# Patient Record
Sex: FEMALE | Race: WHITE | URBAN - METROPOLITAN AREA
[De-identification: names, ages, dates, MRNs, and addresses within clinical notes are randomized per-mention and may not be internally consistent; named-entity substitution may affect disease eponyms.]

---

## 2024-03-07 ENCOUNTER — NURSING HOME VISIT (OUTPATIENT)
Dept: GERIATRICS | Facility: OTHER | Age: 84
End: 2024-03-07
Payer: COMMERCIAL

## 2024-03-07 DIAGNOSIS — T50.2X5A DIURETIC-INDUCED HYPOKALEMIA: ICD-10-CM

## 2024-03-07 DIAGNOSIS — E03.9 HYPOTHYROIDISM, UNSPECIFIED TYPE: ICD-10-CM

## 2024-03-07 DIAGNOSIS — E78.5 HYPERLIPIDEMIA, UNSPECIFIED HYPERLIPIDEMIA TYPE: ICD-10-CM

## 2024-03-07 DIAGNOSIS — I10 ESSENTIAL (PRIMARY) HYPERTENSION: ICD-10-CM

## 2024-03-07 DIAGNOSIS — K21.9 GASTROESOPHAGEAL REFLUX DISEASE WITHOUT ESOPHAGITIS: ICD-10-CM

## 2024-03-07 DIAGNOSIS — Z66 DNR (DO NOT RESUSCITATE): ICD-10-CM

## 2024-03-07 DIAGNOSIS — E87.6 DIURETIC-INDUCED HYPOKALEMIA: ICD-10-CM

## 2024-03-07 DIAGNOSIS — E55.9 VITAMIN D DEFICIENCY: ICD-10-CM

## 2024-03-07 DIAGNOSIS — Z87.898 HISTORY OF SEIZURES: ICD-10-CM

## 2024-03-07 DIAGNOSIS — I50.32 CHRONIC HEART FAILURE WITH PRESERVED EJECTION FRACTION (HCC): ICD-10-CM

## 2024-03-07 DIAGNOSIS — D62 ACUTE BLOOD LOSS AS CAUSE OF POSTOPERATIVE ANEMIA: ICD-10-CM

## 2024-03-07 DIAGNOSIS — I25.10 CORONARY ARTERY DISEASE INVOLVING NATIVE CORONARY ARTERY OF NATIVE HEART WITHOUT ANGINA PECTORIS: ICD-10-CM

## 2024-03-07 DIAGNOSIS — G47.00 INSOMNIA, UNSPECIFIED TYPE: ICD-10-CM

## 2024-03-07 DIAGNOSIS — S72.145D CLOSED NONDISPLACED INTERTROCHANTERIC FRACTURE OF LEFT FEMUR WITH ROUTINE HEALING, SUBSEQUENT ENCOUNTER: Primary | ICD-10-CM

## 2024-03-07 PROCEDURE — 99306 1ST NF CARE HIGH MDM 50: CPT | Performed by: INTERNAL MEDICINE

## 2024-03-11 ENCOUNTER — TELEPHONE (OUTPATIENT)
Dept: OTHER | Facility: OTHER | Age: 84
End: 2024-03-11

## 2024-03-11 PROBLEM — J44.9 COPD (CHRONIC OBSTRUCTIVE PULMONARY DISEASE) (HCC): Status: ACTIVE | Noted: 2024-03-11

## 2024-03-11 PROBLEM — E87.6 DIURETIC-INDUCED HYPOKALEMIA: Status: ACTIVE | Noted: 2024-03-11

## 2024-03-11 PROBLEM — K21.9 GASTROESOPHAGEAL REFLUX DISEASE WITHOUT ESOPHAGITIS: Status: ACTIVE | Noted: 2024-03-11

## 2024-03-11 PROBLEM — Z66 DNR (DO NOT RESUSCITATE): Status: ACTIVE | Noted: 2024-03-07

## 2024-03-11 PROBLEM — I25.10 CORONARY ARTERY DISEASE: Status: ACTIVE | Noted: 2024-03-11

## 2024-03-11 PROBLEM — Z87.898 HISTORY OF SEIZURES: Status: ACTIVE | Noted: 2024-03-11

## 2024-03-11 PROBLEM — E78.5 HYPERLIPIDEMIA: Status: ACTIVE | Noted: 2024-03-11

## 2024-03-11 PROBLEM — G47.00 INSOMNIA: Status: ACTIVE | Noted: 2024-03-11

## 2024-03-11 PROBLEM — R73.01 IFG (IMPAIRED FASTING GLUCOSE): Status: ACTIVE | Noted: 2024-03-11

## 2024-03-11 PROBLEM — D62 ACUTE BLOOD LOSS AS CAUSE OF POSTOPERATIVE ANEMIA: Status: ACTIVE | Noted: 2024-03-11

## 2024-03-11 PROBLEM — I50.32 CHRONIC HEART FAILURE WITH PRESERVED EJECTION FRACTION (HCC): Status: ACTIVE | Noted: 2024-03-11

## 2024-03-11 PROBLEM — T50.2X5A DIURETIC-INDUCED HYPOKALEMIA: Status: ACTIVE | Noted: 2024-03-11

## 2024-03-11 PROBLEM — E03.9 HYPOTHYROID: Status: ACTIVE | Noted: 2024-03-11

## 2024-03-11 PROBLEM — S72.145A CLOSED NONDISPLACED INTERTROCHANTERIC FRACTURE OF LEFT FEMUR (HCC): Status: ACTIVE | Noted: 2024-03-11

## 2024-03-11 PROBLEM — Z86.79 HISTORY OF SUBDURAL HEMATOMA: Status: ACTIVE | Noted: 2024-03-11

## 2024-03-11 PROBLEM — Z87.81 HISTORY OF VERTEBRAL FRACTURE: Status: ACTIVE | Noted: 2024-03-11

## 2024-03-11 PROBLEM — I51.89 DIASTOLIC DYSFUNCTION: Status: ACTIVE | Noted: 2024-03-11

## 2024-03-11 PROBLEM — E55.9 VITAMIN D DEFICIENCY: Status: ACTIVE | Noted: 2024-03-11

## 2024-03-11 PROBLEM — I10 ESSENTIAL (PRIMARY) HYPERTENSION: Status: ACTIVE | Noted: 2024-03-11

## 2024-03-11 RX ORDER — PROPYLENE GLYCOL 0.06 MG/ML
1 SOLUTION/ DROPS OPHTHALMIC EVERY 8 HOURS PRN
COMMUNITY

## 2024-03-11 RX ORDER — ROSUVASTATIN CALCIUM 5 MG/1
5 TABLET, COATED ORAL DAILY
COMMUNITY

## 2024-03-11 RX ORDER — METOPROLOL SUCCINATE 50 MG/1
50 TABLET, EXTENDED RELEASE ORAL DAILY
COMMUNITY

## 2024-03-11 RX ORDER — ASPIRIN 81 MG/1
81 TABLET, CHEWABLE ORAL DAILY
COMMUNITY
Start: 2024-03-30

## 2024-03-11 RX ORDER — FUROSEMIDE 40 MG/1
40 TABLET ORAL DAILY
COMMUNITY

## 2024-03-11 RX ORDER — LEVOTHYROXINE SODIUM 112 UG/1
125 TABLET ORAL DAILY
COMMUNITY
End: 2024-03-21

## 2024-03-11 RX ORDER — ASCORBIC ACID 500 MG
500 TABLET ORAL DAILY
COMMUNITY

## 2024-03-11 RX ORDER — PANTOPRAZOLE SODIUM 20 MG/1
20 TABLET, DELAYED RELEASE ORAL DAILY
COMMUNITY

## 2024-03-11 RX ORDER — POTASSIUM CHLORIDE 1500 MG/1
20 TABLET, EXTENDED RELEASE ORAL 2 TIMES DAILY
COMMUNITY

## 2024-03-11 RX ORDER — ASPIRIN 81 MG/1
81 TABLET, CHEWABLE ORAL 2 TIMES DAILY
COMMUNITY
Start: 2024-03-01 | End: 2024-03-29

## 2024-03-11 RX ORDER — FERROUS SULFATE 325(65) MG
325 TABLET ORAL
COMMUNITY

## 2024-03-11 RX ORDER — CHOLECALCIFEROL (VITAMIN D3) 125 MCG
5 CAPSULE ORAL
COMMUNITY

## 2024-03-11 NOTE — ASSESSMENT & PLAN NOTE
February 29, 2024: H/H/MCV: 14/41.2/90 March 1, 2024: H/H/MCV: 7.3/21.2/89 March 5, 2024: H/H/MCV: 9/26.1/89  Will continue prior to admission oral iron replacement therapy  Will continue with clinical and periodic laboratory monitoring for change in her condition  She will follow-up with her PCP upon discharge

## 2024-03-11 NOTE — ASSESSMENT & PLAN NOTE
She has been doing well without medication  Will continue with monitoring for change in her condition

## 2024-03-11 NOTE — ASSESSMENT & PLAN NOTE
"As discussed with patient during my visit, she wishes her resuscitation status to be \"DO NOT RESUSCITATE\"  "

## 2024-03-11 NOTE — ASSESSMENT & PLAN NOTE
Will continue her prior to admission melatonin at bedtime  Will continue with monitoring for change in her condition

## 2024-03-11 NOTE — ASSESSMENT & PLAN NOTE
February 29, 2024: Stabilization of left intertrochanteric fracture with IM chris (Dr. Marlena AGGARWAL)  Secondary to her decreased level of functioning from baseline, she will be admitted to the subacute rehabilitation facility and seen in consultation by a multidisciplinary rehabilitation team for evaluation and treatment to assist her in returning to her prior level of functioning   I reached out to her orthopedic service and confirmed that her postoperative DVT prophylaxis is to be aspirin 81 mg twice daily 4 weeks postoperatively with return to 81 mg daily for history of coronary artery disease status post CABG  Will continue with multimodal pain management (she declines opioid medication as it is made her delirious in the past)  Will continue with her care in collaboration with her orthopedic service  Will continue with monitoring for change in her condition  She will follow-up with her PCP and orthopedic services upon discharge

## 2024-03-11 NOTE — ASSESSMENT & PLAN NOTE
Will continue prior to admission daily furosemide and oral potassium replacement therapy  Will monitor her weight daily and set target weight between 180 pounds and 190 pounds  Will continue with clinical and periodic laboratory monitoring for change in her condition  She will follow-up with her PCP upon discharge

## 2024-03-11 NOTE — ASSESSMENT & PLAN NOTE
Will continue her prior to admission levothyroxine  She will follow-up with her PCP upon discharge

## 2024-03-11 NOTE — ASSESSMENT & PLAN NOTE
Will continue her prior to admission aspirin and rosuvastatin for secondary prevention  Will continue with monitoring for change in her condition  She will follow-up with her PCP upon discharge

## 2024-03-11 NOTE — ASSESSMENT & PLAN NOTE
Will continue her prior to admission oral vitamin D3 supplement  She will follow-up with her PCP upon discharge

## 2024-03-11 NOTE — PROGRESS NOTES
[facility]  [address]  [place] [address]  Facility: [facility]  [place] [address]    HISTORY AND PHYSICAL    NAME: Pebbles Carreon  AGE: 83 y.o. SEX: female    DATE OF ENCOUNTER: 3/7/2024    Code status:   DNR    Assessment and Plan     1. Closed nondisplaced intertrochanteric fracture of left femur with routine healing, subsequent encounter  Assessment & Plan:  February 29, 2024: Stabilization of left intertrochanteric fracture with IM chris (Dr. Marlena AGGARWAL)  Secondary to her decreased level of functioning from baseline, she will be admitted to the subacute rehabilitation facility and seen in consultation by a multidisciplinary rehabilitation team for evaluation and treatment to assist her in returning to her prior level of functioning   I reached out to her orthopedic service and confirmed that her postoperative DVT prophylaxis is to be aspirin 81 mg twice daily 4 weeks postoperatively with return to 81 mg daily for history of coronary artery disease status post CABG  Will continue with multimodal pain management (she declines opioid medication as it is made her delirious in the past)  Will continue with her care in collaboration with her orthopedic service  Will continue with monitoring for change in her condition  She will follow-up with her PCP and orthopedic services upon discharge      2. Coronary artery disease involving native coronary artery of native heart without angina pectoris  Assessment & Plan:  Will continue her prior to admission aspirin and rosuvastatin for secondary prevention  Will continue with monitoring for change in her condition  She will follow-up with her PCP upon discharge      3. Chronic heart failure with preserved ejection fraction (HCC)  Assessment & Plan:  Will continue prior to admission daily furosemide and oral potassium replacement therapy  Will monitor her weight daily and set target weight between 180  pounds and 190 pounds  Will continue with clinical and periodic laboratory monitoring for change in her condition  She will follow-up with her PCP upon discharge              4. Acute blood loss as cause of postoperative anemia  Assessment & Plan:  February 29, 2024: H/H/MCV: 14/41.2/90 March 1, 2024: H/H/MCV: 7.3/21.2/89 March 5, 2024: H/H/MCV: 9/26.1/89  Will continue prior to admission oral iron replacement therapy  Will continue with clinical and periodic laboratory monitoring for change in her condition  She will follow-up with her PCP upon discharge      5. Diuretic-induced hypokalemia  Assessment & Plan:  Secondary to chronic furosemide use for congestive heart failure history  Will continue her prior to admission oral potassium replacement therapy  Will continue with periodic laboratory monitoring for change in her condition  She will follow-up with her PCP upon discharge      6. Hypothyroidism, unspecified type  Assessment & Plan:  Will continue her prior to admission levothyroxine  She will follow-up with her PCP upon discharge      7. Essential (primary) hypertension  Assessment & Plan:  She has been doing well without medication  Will continue with monitoring for change in her condition      8. Vitamin D deficiency  Assessment & Plan:  Will continue her prior to admission oral vitamin D3 supplement  She will follow-up with her PCP upon discharge      9. History of seizures  Assessment & Plan:  After subdural hematoma in 2022  Successfully tapered off Keppra      10. Gastroesophageal reflux disease without esophagitis  Assessment & Plan:  Will continue her prior to admission PPI therapy  She will follow-up with her PCP upon discharge      11. Hyperlipidemia, unspecified hyperlipidemia type  Assessment & Plan:  Will continue her prior to admission rosuvastatin (son reports intolerance to atorvastatin prior)      12. Insomnia, unspecified type  Assessment & Plan:  Will continue her prior to admission  "melatonin at bedtime  Will continue with monitoring for change in her condition      13. DNR (do not resuscitate)  Assessment & Plan:  As discussed with patient during my visit, she wishes her resuscitation status to be \"DO NOT RESUSCITATE\"          All medications and routine orders were reviewed and updated as needed.    Plan discussed with: Patient, orthopedic service, and nursing staff.    Chief Complaint     She is seen for a visit to perform a history and physical exam to be admitted to the subacute rehabilitation facility.    History of Present Illness     History is obtained from patient interview and review of outside electronic medical record.    She is a pleasant 83-year-old woman with the comorbidities of coronary artery disease status post CABG, chronic heart failure with preserved ejection fraction, diuretic induced hypokalemia, hypothyroidism, hypertension, vitamin D deficiency, history of seizures after subdural hematoma in 2022, GERD, hyperlipidemia, and insomnia who is seen with female nursing staff for a visit to perform a history and physical exam to be admitted to the subacute rehabilitation facility.  She presented to the emergency room on February 28, 2024 after losing her footing and falling on her left hip.  Evaluation in the emergency room revealed an acute left intertrochanteric femur fracture.  She was admitted to the [facility] hospital from February 28, 2024 through March 6, 2024.  On February 29, 2024, she underwent stabilization of a left intratrochanteric femur fracture with IM chris.  She was seen in consultation by the cardiology service during her hospitalization secondary to concern regarding atrial fibrillation on telemetry.  Cardiology service reported no atrial fibrillation but rather sinus rhythm with PACs.  She was transferred to the subacute rehabilitation facility on March 6, 2024.    When asked, she states that her postoperative pain is under control and she does not wish " to use opioid medication secondary to prior reaction of delirium with these medications.    HISTORY:  History reviewed. No pertinent past medical history.  Past Surgical History:   Procedure Laterality Date    CORONARY ARTERY BYPASS GRAFT  2012    X 4    CRANIOTOMY  04/10/2022    Right-sided frontal and parietal bur holes with evacuation of subdural hematoma    TOTAL SHOULDER ARTHROPLASTY Right 03/24/2022     Family History   Problem Relation Age of Onset    Leukemia Mother     Aneurysm Father         Brain    Thyroid disease Sister     Heart disease Sister     Thyroid disease Brother     Prostate cancer Brother      Social History     Socioeconomic History    Marital status: None     Spouse name: None    Number of children: None    Years of education: None    Highest education level: None   Occupational History    None   Tobacco Use    Smoking status: Never    Smokeless tobacco: Never   Substance and Sexual Activity    Alcohol use: None    Drug use: None    Sexual activity: None   Other Topics Concern    None   Social History Narrative    None     Social Determinants of Health     Financial Resource Strain: Low Risk  (2/29/2024)    Received from [facility]    Overall Financial Resource Strain (CARDIA)     Difficulty of Paying Living Expenses: Not hard at all   Food Insecurity: No Food Insecurity (2/29/2024)    Received from [facility]    Hunger Vital Sign     Worried About Running Out of Food in the Last Year: Never true     Ran Out of Food in the Last Year: Never true   Transportation Needs: No Transportation Needs (2/29/2024)    Received from [facility]    PRAPARE - Transportation     Lack of Transportation (Medical): No     Lack of Transportation (Non-Medical): No   Physical Activity: Not on file   Stress: Not on file   Social Connections: Not on file   Intimate Partner Violence: Not At Risk (2/29/2024)    Received from [facility]     Humiliation, Afraid, Rape, and Kick questionnaire     Fear of Current or Ex-Partner: No     Emotionally Abused: No     Physically Abused: No     Sexually Abused: No   Housing Stability: Low Risk  (2/29/2024)    Received from [facility]    Housing Stability Vital Sign     Unable to Pay for Housing in the Last Year: No     Number of Places Lived in the Last Year: 2     Unstable Housing in the Last Year: No       Allergies:  Allergies   Allergen Reactions    Oxycodone Delirium    Peach [Prunus Persica] Hives    Hydrocodone-Acetaminophen Anxiety    Morphine Anxiety       Review of Systems     Review of Systems   Constitutional:  Negative for appetite change.   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 Negative for trouble swallowing.    Eyes:  Positive for visual disturbance (Uses and needs new glasses).   Respiratory:  Negative for shortness of breath.    Cardiovascular:  Negative for chest pain.   Gastrointestinal:  Negative for constipation.   Genitourinary:  Negative for difficulty urinating.   Musculoskeletal:         Some postoperative pain   Neurological:  Negative for headaches.   Psychiatric/Behavioral:  Negative for dysphoric mood and sleep disturbance.        Medications and orders     All medications reviewed and updated in retirement EMR.    Objective     Vitals: Weight 183.5 pounds, temperature 98 °F, pulse 78, blood pressure 118/52, pulse oximetry 94% on room air.    Physical Exam  Vitals reviewed. Exam conducted with a chaperone present.   Constitutional:       General: She is awake. She is not in acute distress.     Appearance: She is well-developed. She is not toxic-appearing or diaphoretic.      Comments: She appears comfortable sitting by her bedside, stated age, and healthy.   Eyes:      General: No scleral icterus.  Cardiovascular:      Rate and Rhythm: Normal rate and regular rhythm.      Heart sounds: Normal heart sounds.  No murmur heard.     No friction rub. No gallop.   Pulmonary:      Effort: Pulmonary effort is normal. No respiratory distress.      Breath sounds: Normal breath sounds. No stridor. No wheezing, rhonchi or rales.   Musculoskeletal:      Right lower leg: No edema.      Left lower leg: No edema.   Skin:     Coloration: Skin is not jaundiced.   Neurological:      Mental Status: She is alert.      Comments: Cognition grossly intact.   Psychiatric:         Mood and Affect: Mood normal.         Behavior: Behavior normal. Behavior is cooperative.       Pertinent Laboratory/Diagnostic Studies:   The following labs/studies were reviewed please see chart or hospital paperwork for details.    March 5, 2024:    BMP: Sodium 140, potassium 3.5, BUN 17, creatinine 0.64, fasting blood sugar 112, EGFR 88    CBC with differential: WBC count 9.7, hemoglobin 9, hematocrit 26.1, platelet count 247,000, MCV 89    February 28, 2024:    Portable chest x-ray:    FINDINGS: Median sternotomy and post CABG change. Stable cardiomediastinal  silhouette. Stable prominent lesion right hemidiaphragm. No pneumothorax. No  pleural effusion. Prominence of lung markings may reflect supine positioning and  low lung volumes with large habitus. Right shoulder replacement. Spine not well  seen.  IMPRESSION:  IMPRESSION: No acute traumatic abnormality identified. Prominence of lung  markings as described above.    February 28, 2024:    CT of chest, abdomen, pelvis with IV contrast:    COMPARISON: CT chest abdomen pelvis dated 6/27/2022     CHEST CT.  LUNGS, PLEURA:  Central airways are patent. There is no focal consolidation. No pneumothorax is  seen. No pleural effusion is noted. Elevation of the right hemidiaphragm with  subjacent compressive atelectasis.    CARDIOVASCULAR, MEDIASTINUM, THYROID:  Postsurgical changes associated median sternotomy and CABG. The heart size is  mildly enlarged. No pericardial effusion is seen. There is  atherosclerotic  calcification of the coronary arteries and thoracic aorta. There is mild to  moderate atherosclerotic calcification of the coronary arteries. The  brachiocephalic artery and left common carotid artery share a common ostia,  which is a anatomic variant.    The visualized thyroid gland appears grossly normal. The esophagus is  underdistended, but appears grossly normal.    LYMPH NODES:  No thoracic lymphadenopathy is seen.    SKELETON, CHEST WALL:  No acute chest wall hematoma. Postsurgical changes associated with right  shoulder arthroplasty.     ABDOMEN CT.  LIVER: Normal in size and configuration. No suspicious mass. Hypoattenuation of  the hepatic parenchyma, favor steatosis. The portal veins are patent.    BILE DUCTS: No intrahepatic or extrahepatic bile duct dilation.    GALLBLADDER: Cholelithiasis without imaging evidence of acute cholecystitis.    PANCREAS: No main pancreatic duct dilation. Grossly stable observation along the  pancreatic tail (series 304, image 111) measuring approximately 1.2 x 2.2 cm  which has similar attenuation with the adjacent spleen, and may reflect an  intrapancreatic splenule.    SPLEEN: Unremarkable.    ADRENAL GLANDS: Unremarkable.    KIDNEYS/URETERS: Bilateral symmetric enhancement. No hydroureteronephrosis. No  suspicious renal mass.    BLADDER: Unremarkable.    REPRODUCTIVE ORGANS: The uterus is present. No adnexal mass.    BOWEL: Small hiatal hernia. No disproportionate dilation of the small or large  bowel. The appendix is unremarkable. Diverticulosis without findings for acute  diverticulitis.    PERITONEUM/RETROPERITONEUM: No fluid collection, ascites, or pneumoperitoneum.    LYMPH NODES: No abdominal or pelvic lymphadenopathy.    VESSELS: Atherosclerotic disease without aortic aneurysm. No high grade disease  of the proximal mesenteric arteries (SMA, celiac, or KEVIN).    ABDOMINAL/PELVIC WALL: Unremarkable.    BONES: Acute nondisplaced comminuted fracture  of the left intertrochanteric  aspect of the femur with associated small hematoma. Please see the separate  dedicated CT report for evaluation of the thoracolumbar spine.      IMPRESSION:  IMPRESSION:  Acute nondisplaced comminuted fracture of the left intertrochanteric aspect of  the femur with associated small hematoma. No additional findings of acute  traumatic injury within the chest, abdomen, or pelvis. Please see the separate  dedicated CT report for evaluation of the thoracolumbar spine.    Other findings as above.    March 1, 2024:    Twelve-lead ECG:    Impression SINUS RHYTHM WITH 1ST DEGREE A-V BLOCK WITH PACs.  T WAVE ABNORMALITY, CONSIDER INFERIOR ISCHEMIA.  ABNORMAL ECG.  WHEN COMPARED WITH ECG OF 29-FEB-2024 16:33,.  MI INTERVAL HAS INCREASED.  QRS AXIS SHIFTED RIGHT.  ST NO LONGER DEPRESSED IN (LATERAL  LEADS).  T WAVE INVERSION NOW EVIDENT IN (INFERIOR LEADS).  NONSPECIFIC T WAVE ABNORMALITY NOW EVIDENT IN (ANTERIOR LEADS).  T WAVE INVERSION NO LONGER EVIDENT IN (LATERAL LEADS)   Confirmed By Confirmed by Ronald Krueger ([id number]) on 3/2/2024 9:52:37 AM   Ventricular Rate EKG/MIN  BPM 87   Atrial Rate  BPM 87   MI-Interval (MSEC)  ms 230   QRS-Interval (MSEC)  ms 92   QT-Interval (MSEC)  ms 342   QTC  ms 411   P Axis  degrees 16   R Axis  degrees 84   T Axis  degrees -57     March 2, 2024:    2D transthoracic echocardiogram:    Narrative    This result has an attachment that is not available.    Left Ventricle: There is mild hypertrophy. Systolic function is normal  with an ejection fraction of 55-60%. There is grade II (moderate)  diastolic dysfunction.    Aortic Valve: The aortic valve is trileaflet. The leaflets are mildly  calcified.    Mitral Valve: There is mild annular calcification. There is trace  regurgitation.    Tricuspid Valve: There is trace regurgitation.    IVC/SVC: The inferior vena cava was mildly dilated.    - Her order summary was reviewed and signed.    Portions of the record may  "have been created with voice recognition software.  Occasional wrong word or \"sound a like\" substitutions may have occurred due to the inherent limitations of voice recognition software.  Read the chart carefully and recognize, using context, where substitutions have occurred.    Darius Mccurdy M.D.  3/11/2024 8:10 AM      "

## 2024-03-11 NOTE — ASSESSMENT & PLAN NOTE
Secondary to chronic furosemide use for congestive heart failure history  Will continue her prior to admission oral potassium replacement therapy  Will continue with periodic laboratory monitoring for change in her condition  She will follow-up with her PCP upon discharge

## 2024-03-13 ENCOUNTER — NURSING HOME VISIT (OUTPATIENT)
Dept: GERIATRICS | Facility: OTHER | Age: 84
End: 2024-03-13
Payer: COMMERCIAL

## 2024-03-13 DIAGNOSIS — R79.89 ELEVATED LFTS: ICD-10-CM

## 2024-03-13 DIAGNOSIS — K21.9 GASTROESOPHAGEAL REFLUX DISEASE WITHOUT ESOPHAGITIS: ICD-10-CM

## 2024-03-13 DIAGNOSIS — I10 ESSENTIAL (PRIMARY) HYPERTENSION: ICD-10-CM

## 2024-03-13 DIAGNOSIS — S72.145D CLOSED NONDISPLACED INTERTROCHANTERIC FRACTURE OF LEFT FEMUR WITH ROUTINE HEALING, SUBSEQUENT ENCOUNTER: ICD-10-CM

## 2024-03-13 DIAGNOSIS — E03.9 HYPOTHYROIDISM, UNSPECIFIED TYPE: ICD-10-CM

## 2024-03-13 DIAGNOSIS — I50.32 CHRONIC HEART FAILURE WITH PRESERVED EJECTION FRACTION (HCC): Primary | ICD-10-CM

## 2024-03-13 PROCEDURE — 99309 SBSQ NF CARE MODERATE MDM 30: CPT | Performed by: PHYSICIAN ASSISTANT

## 2024-03-14 VITALS
WEIGHT: 181 LBS | DIASTOLIC BLOOD PRESSURE: 59 MMHG | RESPIRATION RATE: 20 BRPM | SYSTOLIC BLOOD PRESSURE: 142 MMHG | TEMPERATURE: 97.5 F | HEART RATE: 66 BPM

## 2024-03-14 PROBLEM — R79.89 ELEVATED LFTS: Status: ACTIVE | Noted: 2024-03-14

## 2024-03-14 NOTE — ASSESSMENT & PLAN NOTE
S/p IM chris fixation 2/29/24  Patient subsequently seen in ED for bloody drainage from proximal incision, was diagnosed with hematoma. Was sent back to facility  Continues to have small amount brownish colored drainage. No surrounding erythema  Of note- WBC in ED was 13.3- will repeat labs tomorrow

## 2024-03-14 NOTE — PROGRESS NOTES
[facility]  [address] [address]  [phone]  [facility]  Code 31      NAME: Pebbles Carreon  AGE: 83 y.o. SEX: female [phone]    DATE OF ENCOUNTER: 3/13/24    CODE STATUS: DNR    Assessment and Plan     Problem List Items Addressed This Visit          Cardiovascular and Mediastinum    Chronic heart failure with preserved ejection fraction (HCC) - Primary     Wt Readings from Last 3 Encounters:   03/14/24 82.1 kg (181 lb)       Weight remains in recommended range  Continue current dose lasix               Essential (primary) hypertension     Continue lasix, metoprolol            Digestive    Gastroesophageal reflux disease without esophagitis     Continue PPI            Endocrine    Hypothyroid     Continue levothyroxine            Musculoskeletal and Integument    Closed nondisplaced intertrochanteric fracture of left femur (HCC)     S/p IM chris fixation 2/29/24  Patient subsequently seen in ED for bloody drainage from proximal incision, was diagnosed with hematoma. Was sent back to facility  Continues to have small amount brownish colored drainage. No surrounding erythema  Of note- WBC in ED was 13.3- will repeat labs tomorrow            Other    Elevated LFTs     Labs done during ED stay showed mildly elevated alk phos and T. Bili.  Alk phos 356  T bili 1.5  Will repeat labs            No orders of the defined types were placed in this encounter.          Chief Complaint     Chief Complaint   Patient presents with    Geriatric Evaluation     Follow up       History of Present Illness   83 year old female being seen today for follow up. She is admitted to [facility] for rehab after a hospitalization for hip fracture that required IM chris. She subsequently was sent back to the ED for bloody drainage from her incision. CT scan showed hematoma. Pain controlled.          The following portions of the patient's history were reviewed and updated as  appropriate: allergies, current medications, past family history, past medical history, past social history, past surgical history and problem list.    Review of Systems   Review of Systems   Constitutional: Negative.    HENT: Negative.     Eyes: Negative.    Respiratory: Negative.     Cardiovascular: Negative.    Gastrointestinal: Negative.    Endocrine: Negative.    Genitourinary: Negative.    Musculoskeletal:  Positive for arthralgias and gait problem.   Hematological: Negative.    Psychiatric/Behavioral: Negative.            Active Problem List     Patient Active Problem List   Diagnosis    Chronic heart failure with preserved ejection fraction (HCC)    Coronary artery disease    History of seizures    History of vertebral fracture    Vitamin D deficiency    Gastroesophageal reflux disease without esophagitis    IFG (impaired fasting glucose)    Hyperlipidemia    Hypothyroid    COPD (chronic obstructive pulmonary disease) (HCC)    History of subdural hematoma    Acute blood loss as cause of postoperative anemia    Closed nondisplaced intertrochanteric fracture of left femur ([facility])    Essential (primary) hypertension    Insomnia    DNR (do not resuscitate)    Diuretic-induced hypokalemia    Diastolic dysfunction    Elevated LFTs         Objective     /59   Pulse 66   Temp 97.5 °F (36.4 °C)   Resp 20   Wt 82.1 kg (181 lb)     Physical Exam  Vitals reviewed.   Constitutional:       General: She is not in acute distress.     Appearance: She is not ill-appearing or diaphoretic.   HENT:      Head: Normocephalic and atraumatic.      Nose: Nose normal.      Mouth/Throat:      Pharynx: Oropharynx is clear.   Cardiovascular:      Rate and Rhythm: Normal rate.   Pulmonary:      Effort: Pulmonary effort is normal. No respiratory distress.      Breath sounds: Normal breath sounds.   Abdominal:      General: Bowel sounds are normal. There is no distension.      Palpations: Abdomen is soft.      Tenderness: There is  no abdominal tenderness.   Musculoskeletal:      Comments: Hip incisions with staples intact. No erythema. There is moderate post op ecchymosis. Proximal incision with small amount brownish colored drainage consistent with old hematoma seen on CT scan   Skin:     General: Skin is warm and dry.      Findings: Bruising present. No erythema.   Neurological:      Mental Status: She is alert and oriented to person, place, and time.   Psychiatric:         Mood and Affect: Mood normal.         Behavior: Behavior normal.         Pertinent Laboratory/Diagnostic Studies:    Hgb 10.8  Wbc 13.3  Alk phos 356  T bili 1.5    Current Medications   Medications reviewed and updated in facility chart.

## 2024-03-14 NOTE — ASSESSMENT & PLAN NOTE
Wt Readings from Last 3 Encounters:   03/14/24 82.1 kg (181 lb)       Weight remains in recommended range  Continue current dose lasix

## 2024-03-14 NOTE — ASSESSMENT & PLAN NOTE
Labs done during ED stay showed mildly elevated alk phos and T. Bili.  Alk phos 356  T bili 1.5  Will repeat labs

## 2024-03-20 ENCOUNTER — NURSING HOME VISIT (OUTPATIENT)
Dept: GERIATRICS | Facility: OTHER | Age: 84
End: 2024-03-20
Payer: COMMERCIAL

## 2024-03-20 DIAGNOSIS — I10 ESSENTIAL (PRIMARY) HYPERTENSION: ICD-10-CM

## 2024-03-20 DIAGNOSIS — E03.9 HYPOTHYROIDISM, UNSPECIFIED TYPE: ICD-10-CM

## 2024-03-20 DIAGNOSIS — R11.10 VOMITING, UNSPECIFIED VOMITING TYPE, UNSPECIFIED WHETHER NAUSEA PRESENT: ICD-10-CM

## 2024-03-20 DIAGNOSIS — D62 ACUTE BLOOD LOSS AS CAUSE OF POSTOPERATIVE ANEMIA: ICD-10-CM

## 2024-03-20 DIAGNOSIS — Z66 DNR (DO NOT RESUSCITATE): ICD-10-CM

## 2024-03-20 DIAGNOSIS — K21.9 GASTROESOPHAGEAL REFLUX DISEASE WITHOUT ESOPHAGITIS: ICD-10-CM

## 2024-03-20 DIAGNOSIS — J44.9 CHRONIC OBSTRUCTIVE PULMONARY DISEASE, UNSPECIFIED COPD TYPE (HCC): ICD-10-CM

## 2024-03-20 DIAGNOSIS — S72.145D CLOSED NONDISPLACED INTERTROCHANTERIC FRACTURE OF LEFT FEMUR WITH ROUTINE HEALING, SUBSEQUENT ENCOUNTER: ICD-10-CM

## 2024-03-20 DIAGNOSIS — I50.32 CHRONIC HEART FAILURE WITH PRESERVED EJECTION FRACTION (HCC): Primary | ICD-10-CM

## 2024-03-20 PROCEDURE — 99309 SBSQ NF CARE MODERATE MDM 30: CPT | Performed by: PHYSICIAN ASSISTANT

## 2024-03-21 VITALS
WEIGHT: 186.3 LBS | HEART RATE: 59 BPM | DIASTOLIC BLOOD PRESSURE: 55 MMHG | RESPIRATION RATE: 20 BRPM | TEMPERATURE: 98 F | SYSTOLIC BLOOD PRESSURE: 129 MMHG

## 2024-03-21 PROBLEM — R11.10 VOMITING: Status: ACTIVE | Noted: 2024-03-21

## 2024-03-21 RX ORDER — LEVOTHYROXINE SODIUM 0.12 MG/1
125 TABLET ORAL DAILY
COMMUNITY

## 2024-03-21 NOTE — ASSESSMENT & PLAN NOTE
BP stable  Continue metoprolol  
Continue PPI/tums  
Hgb stable  On 3/1824 hgb 11.3  
Not in exacerbation  Continue to monitor  
One episode of vomiting this AM  She states that her usual routine at home is to wake up, have a cup of coffee and have a BM  This AM she did not have a BM until later in the day. She states she have some epigastric discomfort and threw up. She felt fine after vomiting once. She did end up having a large regular BM a bit later in the AM  Denies abdominal pain/N/V since  Will continue to monitor  
S/p IM chris left hip 2/29/24  Denies pain  Doing well in PT  Did have a post op hematoma that was draining, she was sent out to ED for drainage. No signs infection  Still has staples in place  Going to ortho later this week for follow up    
TSH on 3/18/24 was elevated at 9.73  Will increase levothyroxine dose from 112 to 125 mcg  Will need repeat labs in 6 weeks  
Wt Readings from Last 3 Encounters:   03/21/24 84.5 kg (186 lb 4.8 oz)   03/14/24 82.1 kg (181 lb)     Clinically euvolemic  Continue current dose lasix        
1

## 2024-03-21 NOTE — PROGRESS NOTES
[facility]  [address] [address]  [phone]  [facility]  Code 31      NAME: Pebbles Carreon  AGE: 83 y.o. SEX: female [phone]    DATE OF ENCOUNTER: 3/20/24    CODE STATUS: DNR    Assessment and Plan     Problem List Items Addressed This Visit          Cardiovascular and Mediastinum    Chronic heart failure with preserved ejection fraction (HCC) - Primary     Wt Readings from Last 3 Encounters:   03/21/24 84.5 kg (186 lb 4.8 oz)   03/14/24 82.1 kg (181 lb)     Clinically euvolemic  Continue current dose lasix               Essential (primary) hypertension     BP stable  Continue metoprolol            Respiratory    COPD (chronic obstructive pulmonary disease) (HCC)     Not in exacerbation  Continue to monitor            Digestive    Gastroesophageal reflux disease without esophagitis     Continue PPI/tums         Vomiting     One episode of vomiting this AM  She states that her usual routine at home is to wake up, have a cup of coffee and have a BM  This AM she did not have a BM until later in the day. She states she have some epigastric discomfort and threw up. She felt fine after vomiting once. She did end up having a large regular BM a bit later in the AM  Denies abdominal pain/N/V since  Will continue to monitor            Endocrine    Hypothyroid     TSH on 3/18/24 was elevated at 9.73  Will increase levothyroxine dose from 112 to 125 mcg  Will need repeat labs in 6 weeks         Relevant Medications    levothyroxine 125 mcg tablet       Musculoskeletal and Integument    Closed nondisplaced intertrochanteric fracture of left femur (HCC)     S/p IM chris left hip 2/29/24  Denies pain  Doing well in PT  Did have a post op hematoma that was draining, she was sent out to ED for drainage. No signs infection  Still has staples in place  Going to ortho later this week for follow up              Blood    Acute blood loss as cause of postoperative  anemia     Hgb stable  On 3/1824 hgb 11.3            Care Coordination    DNR (do not resuscitate)       No orders of the defined types were placed in this encounter.          Chief Complaint     Chief Complaint   Patient presents with    Geriatric Evaluation     Follow up       History of Present Illness   83 year old female being seen today in collaboration with nursing for follow up. She is admitted to [facility] for rehab after a left hip fx. She is doing well with PT. She has follow up this week with ortho. She did have one episode of vomiting this AM but now she feels fine         The following portions of the patient's history were reviewed and updated as appropriate: allergies, current medications, past family history, past medical history, past social history, past surgical history and problem list.    Review of Systems   Review of Systems   Constitutional: Negative.    HENT: Negative.     Eyes: Negative.    Respiratory: Negative.     Cardiovascular: Negative.    Gastrointestinal:  Positive for vomiting. Negative for constipation.   Endocrine: Negative.    Genitourinary: Negative.    Musculoskeletal:  Positive for gait problem.   Skin: Negative.    Hematological: Negative.    Psychiatric/Behavioral: Negative.            Active Problem List     Patient Active Problem List   Diagnosis    Chronic heart failure with preserved ejection fraction (HCC)    Coronary artery disease    History of seizures    History of vertebral fracture    Vitamin D deficiency    Gastroesophageal reflux disease without esophagitis    IFG (impaired fasting glucose)    Hyperlipidemia    Hypothyroid    COPD (chronic obstructive pulmonary disease) (HCC)    History of subdural hematoma    Acute blood loss as cause of postoperative anemia    Closed nondisplaced intertrochanteric fracture of left femur (HCC)    Essential (primary) hypertension    Insomnia    DNR (do not resuscitate)    Diuretic-induced hypokalemia    Diastolic dysfunction     Elevated LFTs    Vomiting         Objective     /55   Pulse 59   Temp 98 °F (36.7 °C)   Resp 20   Wt 84.5 kg (186 lb 4.8 oz)     Physical Exam  Vitals reviewed.   Constitutional:       General: She is not in acute distress.     Appearance: She is not ill-appearing or diaphoretic.   HENT:      Nose: Nose normal.      Mouth/Throat:      Pharynx: Oropharynx is clear.   Cardiovascular:      Rate and Rhythm: Normal rate.   Pulmonary:      Effort: Pulmonary effort is normal. No respiratory distress.      Breath sounds: Normal breath sounds.   Abdominal:      General: Bowel sounds are normal. There is no distension.      Palpations: Abdomen is soft.      Tenderness: There is no abdominal tenderness.   Musculoskeletal:         General: No tenderness or deformity.   Skin:     Comments: Left hip post op ecchymosis as expected, resolving  At times small amount of brownish colored drainage from distal portion of upper incision, consistent with post op hematoma. No erythema or signs infection. Staples intact   Neurological:      Mental Status: She is alert. Mental status is at baseline.   Psychiatric:         Mood and Affect: Mood normal.         Pertinent Laboratory/Diagnostic Studies:    3/18/24  TSH 9.73 (H)  Hgb 11.3  Wbc 9.2  Creat 0.72    K 4.5    Current Medications   Medications reviewed and updated in facility chart.

## 2024-03-27 ENCOUNTER — NURSING HOME VISIT (OUTPATIENT)
Dept: GERIATRICS | Facility: OTHER | Age: 84
End: 2024-03-27
Payer: COMMERCIAL

## 2024-03-27 VITALS
DIASTOLIC BLOOD PRESSURE: 65 MMHG | WEIGHT: 185.4 LBS | RESPIRATION RATE: 20 BRPM | TEMPERATURE: 97.7 F | SYSTOLIC BLOOD PRESSURE: 153 MMHG | HEART RATE: 70 BPM

## 2024-03-27 DIAGNOSIS — E78.5 HYPERLIPIDEMIA, UNSPECIFIED HYPERLIPIDEMIA TYPE: ICD-10-CM

## 2024-03-27 DIAGNOSIS — K21.9 GASTROESOPHAGEAL REFLUX DISEASE WITHOUT ESOPHAGITIS: ICD-10-CM

## 2024-03-27 DIAGNOSIS — E03.9 HYPOTHYROIDISM, UNSPECIFIED TYPE: ICD-10-CM

## 2024-03-27 DIAGNOSIS — S72.145D CLOSED NONDISPLACED INTERTROCHANTERIC FRACTURE OF LEFT FEMUR WITH ROUTINE HEALING, SUBSEQUENT ENCOUNTER: ICD-10-CM

## 2024-03-27 DIAGNOSIS — D62 ACUTE BLOOD LOSS AS CAUSE OF POSTOPERATIVE ANEMIA: ICD-10-CM

## 2024-03-27 DIAGNOSIS — Z66 DNR (DO NOT RESUSCITATE): ICD-10-CM

## 2024-03-27 DIAGNOSIS — I10 ESSENTIAL (PRIMARY) HYPERTENSION: ICD-10-CM

## 2024-03-27 DIAGNOSIS — J44.9 CHRONIC OBSTRUCTIVE PULMONARY DISEASE, UNSPECIFIED COPD TYPE (HCC): ICD-10-CM

## 2024-03-27 DIAGNOSIS — I50.32 CHRONIC HEART FAILURE WITH PRESERVED EJECTION FRACTION (HCC): Primary | ICD-10-CM

## 2024-03-27 PROBLEM — R11.10 VOMITING: Status: RESOLVED | Noted: 2024-03-21 | Resolved: 2024-03-27

## 2024-03-27 PROCEDURE — 99309 SBSQ NF CARE MODERATE MDM 30: CPT | Performed by: PHYSICIAN ASSISTANT

## 2024-03-27 NOTE — ASSESSMENT & PLAN NOTE
S/p IM chris fixation left hip 2/29/24  Was seen by ortho last week for staple removal  Continues to have drainage from a small portion of the distal aspect of her upper incision. No signs infection. She was diagnosed with post op hematoma previously.   Ortho office was contacted today and update given in regards to continued drainage, was previously dark brown colored, consistent with hematoma. Today drainage more serous. Will continue to cover with dry sterile dressing  Continue PT, WBAT

## 2024-03-27 NOTE — ASSESSMENT & PLAN NOTE
Wt Readings from Last 3 Encounters:   03/27/24 84.1 kg (185 lb 6.4 oz)   03/21/24 84.5 kg (186 lb 4.8 oz)   03/14/24 82.1 kg (181 lb)     Weight stable  Continue current dose lasix

## 2024-03-27 NOTE — ASSESSMENT & PLAN NOTE
TSH on 3/18/24 elevated at 9.73  Levothyroxine dose increased last week from 112 to 125  Will need repeat labs 6 weeks from dose increase

## 2024-03-27 NOTE — PROGRESS NOTES
[facility]  [address] [address]  [phone]  [facility]  Code 31      NAME: Pebbles Carreon  AGE: 83 y.o. SEX: female [phone]    DATE OF ENCOUNTER: 3/27/2024    CODE STATUS: DNR    Assessment and Plan     Problem List Items Addressed This Visit          Cardiovascular and Mediastinum    Chronic heart failure with preserved ejection fraction (HCC) - Primary     Wt Readings from Last 3 Encounters:   03/27/24 84.1 kg (185 lb 6.4 oz)   03/21/24 84.5 kg (186 lb 4.8 oz)   03/14/24 82.1 kg (181 lb)     Weight stable  Continue current dose lasix               Essential (primary) hypertension     Continue metoprolol            Respiratory    COPD (chronic obstructive pulmonary disease) ([facility])     No exacerbation  Continue to monitor            Digestive    Gastroesophageal reflux disease without esophagitis     Continue PPI            Endocrine    Hypothyroid     TSH on 3/18/24 elevated at 9.73  Levothyroxine dose increased last week from 112 to 125  Will need repeat labs 6 weeks from dose increase              Musculoskeletal and Integument    Closed nondisplaced intertrochanteric fracture of left femur ([facility])     S/p IM chris fixation left hip 2/29/24  Was seen by ortho last week for staple removal  Continues to have drainage from a small portion of the distal aspect of her upper incision. No signs infection. She was diagnosed with post op hematoma previously.   Ortho office was contacted today and update given in regards to continued drainage, was previously dark brown colored, consistent with hematoma. Today drainage more serous. Will continue to cover with dry sterile dressing  Continue PT, WBAT            Blood    Acute blood loss as cause of postoperative anemia     Post op HGB was down to 7.3  Now hgb up to 11.3  Continue to monitor            Care Coordination    DNR (do not resuscitate)       Other    Hyperlipidemia     Continue statin             No orders of the defined types were placed in this encounter.          Chief Complaint     Chief Complaint   Patient presents with    Geriatric Evaluation     Follow up       History of Present Illness   83 year old female being seen today for follow up. She is admitted to [facility] for rehab after a hospitalization for hip fracture. Pain in controlled. Appetite good. Sleeping well. Hoping to return home when able         The following portions of the patient's history were reviewed and updated as appropriate: allergies, current medications, past family history, past medical history, past social history, past surgical history and problem list.    Review of Systems   Review of Systems   Constitutional: Negative.    HENT: Negative.     Eyes: Negative.    Respiratory: Negative.     Cardiovascular: Negative.    Gastrointestinal: Negative.    Endocrine: Negative.    Genitourinary: Negative.    Musculoskeletal:  Positive for arthralgias and gait problem.   Hematological: Negative.    Psychiatric/Behavioral: Negative.            Active Problem List     Patient Active Problem List   Diagnosis    Chronic heart failure with preserved ejection fraction (HCC)    Coronary artery disease    History of seizures    History of vertebral fracture    Vitamin D deficiency    Gastroesophageal reflux disease without esophagitis    IFG (impaired fasting glucose)    Hyperlipidemia    Hypothyroid    COPD (chronic obstructive pulmonary disease) (HCC)    History of subdural hematoma    Acute blood loss as cause of postoperative anemia    Closed nondisplaced intertrochanteric fracture of left femur (HCC)    Essential (primary) hypertension    Insomnia    DNR (do not resuscitate)    Diuretic-induced hypokalemia    Diastolic dysfunction    Elevated LFTs         Objective     /65   Pulse 70   Temp 97.7 °F (36.5 °C)   Resp 20   Wt 84.1 kg (185 lb 6.4 oz)     Physical Exam  Vitals reviewed.   Constitutional:       General: She is  not in acute distress.     Appearance: She is not ill-appearing or diaphoretic.   HENT:      Head: Normocephalic and atraumatic.      Nose: Nose normal.      Mouth/Throat:      Pharynx: Oropharynx is clear.   Cardiovascular:      Rate and Rhythm: Normal rate.   Pulmonary:      Effort: Pulmonary effort is normal. No respiratory distress.      Breath sounds: Normal breath sounds.   Abdominal:      General: Bowel sounds are normal. There is no distension.      Palpations: Abdomen is soft.      Tenderness: There is no abdominal tenderness.   Musculoskeletal:         General: No deformity or signs of injury.   Skin:     General: Skin is warm and dry.      Findings: No erythema.   Neurological:      Mental Status: She is alert and oriented to person, place, and time.   Psychiatric:         Mood and Affect: Mood normal.         Behavior: Behavior normal.         Pertinent Laboratory/Diagnostic Studies:    3/18/24  TSH 9.73 free T4 0.87  Vit D 54  Hgb 11.3  Wbc 9.2  Creat 0.72    K 4.5    Current Medications   Medications reviewed and updated in facility chart.

## 2024-04-02 ENCOUNTER — NURSING HOME VISIT (OUTPATIENT)
Dept: GERIATRICS | Facility: OTHER | Age: 84
End: 2024-04-02
Payer: COMMERCIAL

## 2024-04-02 VITALS
WEIGHT: 185 LBS | SYSTOLIC BLOOD PRESSURE: 130 MMHG | TEMPERATURE: 98.2 F | RESPIRATION RATE: 18 BRPM | DIASTOLIC BLOOD PRESSURE: 54 MMHG | HEART RATE: 72 BPM

## 2024-04-02 DIAGNOSIS — S72.145D CLOSED NONDISPLACED INTERTROCHANTERIC FRACTURE OF LEFT FEMUR WITH ROUTINE HEALING, SUBSEQUENT ENCOUNTER: Primary | ICD-10-CM

## 2024-04-02 PROCEDURE — 99308 SBSQ NF CARE LOW MDM 20: CPT | Performed by: PHYSICIAN ASSISTANT

## 2024-04-02 NOTE — PROGRESS NOTES
"[facility]  [address] [address]  [phone]  [facility]  Code 31       NAME: Pebbles Carreon  AGE: 83 y.o. SEX: female [phone]    DATE OF ENCOUNTER: 4/2/2024    CODE STATUS: DNR    Assessment and Plan     Problem List Items Addressed This Visit          Musculoskeletal and Integument    Closed nondisplaced intertrochanteric fracture of left femur (HCC) - Primary     2/29/24 IM chris fixation left IT fracture  On 3/12/24 patient was sent from [facility] to ED due to large amount of bloody drainage from her hip incision. She had a CT scan. She was diagnosed with hematoma that was draining  She was seen in ortho office on 3/21 and had her staples removed  She continues to have fairly significant amount of drainage noted from the distal portion of the upper incision  Drainage remains dark brown in color. Is requiring ABD bandage due to amount of drainage  Nursing has called to ortho office to report ongoing drainage, most recently end of last week  Over the weekend nursing reported that patient now has \"a hard swollen area\" at the proximal aspect of the incision with mild redness. Afebrile  I spoke with ortho office personally to report findings  Appt made for tomorrow to follow up with ortho            No orders of the defined types were placed in this encounter.          Chief Complaint     Chief Complaint   Patient presents with    Geriatric Evaluation     Follow up       History of Present Illness   83 year old female being seen today for follow up. She is > 1 month s/p IM chris fixation left hip. She continues to have drainage from the upper incision. Spoke with ortho, appt made for tomorrow         The following portions of the patient's history were reviewed and updated as appropriate: allergies, current medications, past family history, past medical history, past social history, past surgical history and problem list.    Review of Systems "   Review of Systems   Constitutional: Negative.    Respiratory: Negative.     Gastrointestinal: Negative.    Skin:  Positive for color change.          Active Problem List     Patient Active Problem List   Diagnosis    Chronic heart failure with preserved ejection fraction (HCC)    Coronary artery disease    History of seizures    History of vertebral fracture    Vitamin D deficiency    Gastroesophageal reflux disease without esophagitis    IFG (impaired fasting glucose)    Hyperlipidemia    Hypothyroid    COPD (chronic obstructive pulmonary disease) (HCC)    History of subdural hematoma    Acute blood loss as cause of postoperative anemia    Closed nondisplaced intertrochanteric fracture of left femur (HCC)    Essential (primary) hypertension    Insomnia    DNR (do not resuscitate)    Diuretic-induced hypokalemia    Diastolic dysfunction    Elevated LFTs         Objective     /54   Pulse 72   Temp 98.2 °F (36.8 °C)   Resp 18   Wt 83.9 kg (185 lb)     Physical Exam  Vitals reviewed.   Constitutional:       General: She is not in acute distress.     Appearance: She is not ill-appearing or diaphoretic.   Skin:     Comments: Dark brown colored drainage continues from upper incision. Now with increased swelling and warmth at the proximal portion of this incision   Neurological:      Mental Status: She is alert.         Pertinent Laboratory/Diagnostic Studies:    3/18/24  HGB 11.3  WBC 9.2    Current Medications   Medications reviewed and updated in facility chart.

## 2024-04-02 NOTE — ASSESSMENT & PLAN NOTE
"2/29/24 IM chris fixation left IT fracture  On 3/12/24 patient was sent from [facility] to ED due to large amount of bloody drainage from her hip incision. She had a CT scan. She was diagnosed with hematoma that was draining  She was seen in ortho office on 3/21 and had her staples removed  She continues to have fairly significant amount of drainage noted from the distal portion of the upper incision  Drainage remains dark brown in color. Is requiring ABD bandage due to amount of drainage  Nursing has called to ortho office to report ongoing drainage, most recently end of last week  Over the weekend nursing reported that patient now has \"a hard swollen area\" at the proximal aspect of the incision with mild redness. Afebrile  I spoke with ortho office personally to report findings  Appt made for tomorrow to follow up with ortho  "

## 2024-04-05 ENCOUNTER — NURSING HOME VISIT (OUTPATIENT)
Dept: GERIATRICS | Facility: OTHER | Age: 84
End: 2024-04-05
Payer: COMMERCIAL

## 2024-04-05 DIAGNOSIS — Z66 DNR (DO NOT RESUSCITATE): ICD-10-CM

## 2024-04-05 DIAGNOSIS — I10 ESSENTIAL (PRIMARY) HYPERTENSION: ICD-10-CM

## 2024-04-05 DIAGNOSIS — E78.5 HYPERLIPIDEMIA, UNSPECIFIED HYPERLIPIDEMIA TYPE: ICD-10-CM

## 2024-04-05 DIAGNOSIS — S72.145D CLOSED NONDISPLACED INTERTROCHANTERIC FRACTURE OF LEFT FEMUR WITH ROUTINE HEALING, SUBSEQUENT ENCOUNTER: ICD-10-CM

## 2024-04-05 DIAGNOSIS — D62 ACUTE BLOOD LOSS AS CAUSE OF POSTOPERATIVE ANEMIA: ICD-10-CM

## 2024-04-05 DIAGNOSIS — E03.9 HYPOTHYROIDISM, UNSPECIFIED TYPE: ICD-10-CM

## 2024-04-05 DIAGNOSIS — K21.9 GASTROESOPHAGEAL REFLUX DISEASE WITHOUT ESOPHAGITIS: ICD-10-CM

## 2024-04-05 DIAGNOSIS — I50.32 CHRONIC HEART FAILURE WITH PRESERVED EJECTION FRACTION (HCC): Primary | ICD-10-CM

## 2024-04-05 PROCEDURE — 99309 SBSQ NF CARE MODERATE MDM 30: CPT | Performed by: PHYSICIAN ASSISTANT

## 2024-04-09 VITALS
HEART RATE: 78 BPM | TEMPERATURE: 98.2 F | RESPIRATION RATE: 18 BRPM | SYSTOLIC BLOOD PRESSURE: 120 MMHG | DIASTOLIC BLOOD PRESSURE: 60 MMHG | WEIGHT: 185.4 LBS

## 2024-04-09 NOTE — ASSESSMENT & PLAN NOTE
Wt Readings from Last 3 Encounters:   04/09/24 84.1 kg (185 lb 6.4 oz)   04/02/24 83.9 kg (185 lb)   03/27/24 84.1 kg (185 lb 6.4 oz)     Weight stable  Continue current dose lasix

## 2024-04-09 NOTE — ASSESSMENT & PLAN NOTE
S/p IM chris fixation 2/29/24  Patient continues to have drainage from a small area of the upper incision  She was seen today by ortho for wound check  She was ordered doxycycline for 1 week  Continue to monitor closely

## 2024-04-09 NOTE — PROGRESS NOTES
[facility]  [address] [address]  [phone]  [facility]  Code 31      NAME: Pebbles Carreon  AGE: 83 y.o. SEX: female [phone]    DATE OF ENCOUNTER: 4/5/24    CODE STATUS: DNR    Assessment and Plan     Problem List Items Addressed This Visit          Cardiovascular and Mediastinum    Chronic heart failure with preserved ejection fraction (HCC) - Primary     Wt Readings from Last 3 Encounters:   04/09/24 84.1 kg (185 lb 6.4 oz)   04/02/24 83.9 kg (185 lb)   03/27/24 84.1 kg (185 lb 6.4 oz)     Weight stable  Continue current dose lasix               Essential (primary) hypertension     BP stable  Continue metoprolol            Digestive    Gastroesophageal reflux disease without esophagitis     Continue PPI            Endocrine    Hypothyroid     Continue levothyroxine            Musculoskeletal and Integument    Closed nondisplaced intertrochanteric fracture of left femur (HCC)     S/p IM chris fixation 2/29/24  Patient continues to have drainage from a small area of the upper incision  She was seen today by ortho for wound check  She was ordered doxycycline for 1 week  Continue to monitor closely            Blood    Acute blood loss as cause of postoperative anemia     3/18/24 hgb stable 11.3  Continue periodic monitoring of blood work            Care Coordination    DNR (do not resuscitate)       Other    Hyperlipidemia     Continue statin            No orders of the defined types were placed in this encounter.          Chief Complaint     Chief Complaint   Patient presents with    Geriatric Evaluation     Follow up       History of Present Illness   83 year old female being seen today in collaboration with nursing. She is admitted to [facility] for rehab after a hospitalization for hip fracture in Feb. Denies pain. Was seen for wound check by ortho today and was started on doxycycline.          The following portions of the patient's history  were reviewed and updated as appropriate: allergies, current medications, past family history, past medical history, past social history, past surgical history and problem list.    Review of Systems   Review of Systems   Constitutional: Negative.    HENT: Negative.     Eyes: Negative.    Respiratory: Negative.     Cardiovascular: Negative.    Gastrointestinal: Negative.    Endocrine: Negative.    Genitourinary: Negative.    Musculoskeletal:  Positive for gait problem. Negative for arthralgias.   Skin:         Small amount dark bloody drainage on hip bandage   Hematological: Negative.    Psychiatric/Behavioral: Negative.            Active Problem List     Patient Active Problem List   Diagnosis    Chronic heart failure with preserved ejection fraction (HCC)    Coronary artery disease    History of seizures    History of vertebral fracture    Vitamin D deficiency    Gastroesophageal reflux disease without esophagitis    IFG (impaired fasting glucose)    Hyperlipidemia    Hypothyroid    COPD (chronic obstructive pulmonary disease) ([facility])    History of subdural hematoma    Acute blood loss as cause of postoperative anemia    Closed nondisplaced intertrochanteric fracture of left femur ([facility])    Essential (primary) hypertension    Insomnia    DNR (do not resuscitate)    Diuretic-induced hypokalemia    Diastolic dysfunction    Elevated LFTs         Objective     /60   Pulse 78   Temp 98.2 °F (36.8 °C)   Resp 18   Wt 84.1 kg (185 lb 6.4 oz)     Physical Exam  Vitals reviewed.   Constitutional:       General: She is not in acute distress.     Appearance: She is not ill-appearing or diaphoretic.   HENT:      Head: Normocephalic and atraumatic.      Nose: Nose normal.   Eyes:      Conjunctiva/sclera: Conjunctivae normal.   Cardiovascular:      Rate and Rhythm: Normal rate.   Pulmonary:      Effort: Pulmonary effort is normal. No respiratory distress.      Breath sounds: Normal breath sounds.   Abdominal:      General:  Bowel sounds are normal. There is no distension.      Palpations: Abdomen is soft.      Tenderness: There is no abdominal tenderness.   Musculoskeletal:         General: No deformity.   Skin:     General: Skin is warm.      Comments: Small amount old dark bloody drainage noted on hip dressing   Neurological:      Mental Status: She is alert and oriented to person, place, and time.   Psychiatric:         Mood and Affect: Mood normal.         Behavior: Behavior normal.         Pertinent Laboratory/Diagnostic Studies:    3/18/24  TSH 9.73- dose adjusted  Free T4- 0.87  Vit D 54  WBC 9.2  Hgb 11.3    Current Medications   Medications reviewed and updated in facility chart.

## 2024-04-11 ENCOUNTER — NURSING HOME VISIT (OUTPATIENT)
Dept: GERIATRICS | Facility: OTHER | Age: 84
End: 2024-04-11
Payer: COMMERCIAL

## 2024-04-11 DIAGNOSIS — T50.2X5A DIURETIC-INDUCED HYPOKALEMIA: ICD-10-CM

## 2024-04-11 DIAGNOSIS — E87.6 DIURETIC-INDUCED HYPOKALEMIA: ICD-10-CM

## 2024-04-11 DIAGNOSIS — I50.32 CHRONIC HEART FAILURE WITH PRESERVED EJECTION FRACTION (HCC): Primary | ICD-10-CM

## 2024-04-11 PROCEDURE — 99308 SBSQ NF CARE LOW MDM 20: CPT | Performed by: INTERNAL MEDICINE

## 2024-04-18 ENCOUNTER — NURSING HOME VISIT (OUTPATIENT)
Dept: GERIATRICS | Facility: OTHER | Age: 84
End: 2024-04-18
Payer: COMMERCIAL

## 2024-04-18 VITALS
DIASTOLIC BLOOD PRESSURE: 46 MMHG | RESPIRATION RATE: 18 BRPM | WEIGHT: 185.1 LBS | HEART RATE: 63 BPM | TEMPERATURE: 98 F | SYSTOLIC BLOOD PRESSURE: 115 MMHG

## 2024-04-18 DIAGNOSIS — S72.145D CLOSED NONDISPLACED INTERTROCHANTERIC FRACTURE OF LEFT FEMUR WITH ROUTINE HEALING, SUBSEQUENT ENCOUNTER: ICD-10-CM

## 2024-04-18 DIAGNOSIS — E03.9 HYPOTHYROIDISM, UNSPECIFIED TYPE: ICD-10-CM

## 2024-04-18 DIAGNOSIS — I50.32 CHRONIC HEART FAILURE WITH PRESERVED EJECTION FRACTION (HCC): Primary | ICD-10-CM

## 2024-04-18 DIAGNOSIS — K21.9 GASTROESOPHAGEAL REFLUX DISEASE WITHOUT ESOPHAGITIS: ICD-10-CM

## 2024-04-18 DIAGNOSIS — Z66 DNR (DO NOT RESUSCITATE): ICD-10-CM

## 2024-04-18 DIAGNOSIS — E78.5 HYPERLIPIDEMIA, UNSPECIFIED HYPERLIPIDEMIA TYPE: ICD-10-CM

## 2024-04-18 DIAGNOSIS — D62 ACUTE BLOOD LOSS AS CAUSE OF POSTOPERATIVE ANEMIA: ICD-10-CM

## 2024-04-18 DIAGNOSIS — I10 ESSENTIAL (PRIMARY) HYPERTENSION: ICD-10-CM

## 2024-04-18 PROCEDURE — 99309 SBSQ NF CARE MODERATE MDM 30: CPT | Performed by: PHYSICIAN ASSISTANT

## 2024-04-18 NOTE — ASSESSMENT & PLAN NOTE
Wt Readings from Last 3 Encounters:   04/18/24 84 kg (185 lb 1.6 oz)   04/09/24 84.1 kg (185 lb 6.4 oz)   04/02/24 83.9 kg (185 lb)     Weight remains in recommended weight range  Continue lasix

## 2024-04-18 NOTE — ASSESSMENT & PLAN NOTE
Resolved  Hgb as low as 7.3 on 3/1/24  Hgb on 4/15 up to 12.3.   Continue periodic monitoring of lab work

## 2024-04-18 NOTE — PROGRESS NOTES
[facility]  [address] [address]  [phone]  [facility]  Code 31      NAME: Pebbles Carreon  AGE: 83 y.o. SEX: female [phone]    DATE OF ENCOUNTER: 4/18/2024    CODE STATUS: DNR    Assessment and Plan     Problem List Items Addressed This Visit          Cardiovascular and Mediastinum    Chronic heart failure with preserved ejection fraction (HCC) - Primary     Wt Readings from Last 3 Encounters:   04/18/24 84 kg (185 lb 1.6 oz)   04/09/24 84.1 kg (185 lb 6.4 oz)   04/02/24 83.9 kg (185 lb)     Weight remains in recommended weight range  Continue lasix               Essential (primary) hypertension     BP controlled  Continue metoprolol, lasix            Digestive    Gastroesophageal reflux disease without esophagitis     Was having stomach discomfort which she thinks was from the probiotics. No issues since probiotic discontinued  Continue PPI            Endocrine    Hypothyroid     Continue levothyroxine            Musculoskeletal and Integument    Closed nondisplaced intertrochanteric fracture of left femur (HCC)     S/p IM chris fixation 2/29/24  Denies pain   Was having issues with post op drainage, was started on doxycycline by ortho which is now completed. Per nursing, patient had very small spot of serous drainage on her bandage today and the area of swelling is smaller  Patient has follow up today with ortho            Blood    Acute blood loss as cause of postoperative anemia     Resolved  Hgb as low as 7.3 on 3/1/24  Hgb on 4/15 up to 12.3.   Continue periodic monitoring of lab work            Care Coordination    DNR (do not resuscitate)       Other    Hyperlipidemia     Continue statin            No orders of the defined types were placed in this encounter.          Chief Complaint     Chief Complaint   Patient presents with    Geriatric Evaluation     Follow up       History of Present Illness   83 year old female being seen today in  collaboration with nursing. She remains at [facility] for rehab after her hip fracture in February. She has a follow up with ortho today. She was complaining of some upset stomach which she thinks was from her probiotics. She is back to baseline after probiotic discontinued. Plans are being made for discharge home early next week         The following portions of the patient's history were reviewed and updated as appropriate: allergies, current medications, past family history, past medical history, past social history, past surgical history and problem list.    Review of Systems   Review of Systems   Constitutional: Negative.    HENT: Negative.     Eyes: Negative.    Respiratory: Negative.     Cardiovascular: Negative.    Gastrointestinal:  Positive for constipation.   Endocrine: Negative.    Genitourinary: Negative.    Musculoskeletal:  Positive for arthralgias and gait problem.   Hematological: Negative.    Psychiatric/Behavioral: Negative.            Active Problem List     Patient Active Problem List   Diagnosis    Chronic heart failure with preserved ejection fraction (HCC)    Coronary artery disease    History of seizures    History of vertebral fracture    Vitamin D deficiency    Gastroesophageal reflux disease without esophagitis    IFG (impaired fasting glucose)    Hyperlipidemia    Hypothyroid    COPD (chronic obstructive pulmonary disease) (HCC)    History of subdural hematoma    Acute blood loss as cause of postoperative anemia    Closed nondisplaced intertrochanteric fracture of left femur (HCC)    Essential (primary) hypertension    Insomnia    DNR (do not resuscitate)    Diuretic-induced hypokalemia    Diastolic dysfunction    Elevated LFTs         Objective     BP (!) 115/46   Pulse 63   Temp 98 °F (36.7 °C)   Resp 18   Wt 84 kg (185 lb 1.6 oz)     Physical Exam  Vitals reviewed.   Constitutional:       General: She is not in acute distress.     Appearance: She is not ill-appearing or  diaphoretic.   HENT:      Head: Normocephalic and atraumatic.      Nose: Nose normal.      Mouth/Throat:      Pharynx: Oropharynx is clear.   Cardiovascular:      Rate and Rhythm: Normal rate.   Pulmonary:      Effort: Pulmonary effort is normal. No respiratory distress.      Breath sounds: Normal breath sounds.   Abdominal:      General: There is no distension.      Palpations: Abdomen is soft.      Tenderness: There is no abdominal tenderness.   Musculoskeletal:         General: No deformity or signs of injury.   Skin:     General: Skin is warm and dry.      Findings: No bruising or erythema.   Neurological:      Mental Status: She is alert and oriented to person, place, and time.   Psychiatric:         Mood and Affect: Mood normal.         Behavior: Behavior normal.         Pertinent Laboratory/Diagnostic Studies:    4/15/24  Hgb 12.3  Wbc 8.8  Creat 0.86  K 3.7    Current Medications   Medications reviewed and updated in facility chart.

## 2024-04-18 NOTE — ASSESSMENT & PLAN NOTE
S/p IM chris fixation 2/29/24  Denies pain   Was having issues with post op drainage, was started on doxycycline by ortho which is now completed. Per nursing, patient had very small spot of serous drainage on her bandage today and the area of swelling is smaller  Patient has follow up today with ortho

## 2024-04-18 NOTE — ASSESSMENT & PLAN NOTE
Was having stomach discomfort which she thinks was from the probiotics. No issues since probiotic discontinued  Continue PPI

## 2024-04-22 ENCOUNTER — NURSING HOME VISIT (OUTPATIENT)
Dept: GERIATRICS | Facility: OTHER | Age: 84
End: 2024-04-22
Payer: COMMERCIAL

## 2024-04-22 VITALS
RESPIRATION RATE: 18 BRPM | WEIGHT: 183.8 LBS | TEMPERATURE: 98 F | DIASTOLIC BLOOD PRESSURE: 46 MMHG | SYSTOLIC BLOOD PRESSURE: 115 MMHG | HEART RATE: 63 BPM

## 2024-04-22 DIAGNOSIS — K21.9 GASTROESOPHAGEAL REFLUX DISEASE WITHOUT ESOPHAGITIS: ICD-10-CM

## 2024-04-22 DIAGNOSIS — Z66 DNR (DO NOT RESUSCITATE): ICD-10-CM

## 2024-04-22 DIAGNOSIS — I10 ESSENTIAL (PRIMARY) HYPERTENSION: ICD-10-CM

## 2024-04-22 DIAGNOSIS — I50.32 CHRONIC HEART FAILURE WITH PRESERVED EJECTION FRACTION (HCC): Primary | ICD-10-CM

## 2024-04-22 DIAGNOSIS — E03.9 HYPOTHYROIDISM, UNSPECIFIED TYPE: ICD-10-CM

## 2024-04-22 DIAGNOSIS — E78.5 HYPERLIPIDEMIA, UNSPECIFIED HYPERLIPIDEMIA TYPE: ICD-10-CM

## 2024-04-22 DIAGNOSIS — S72.145D CLOSED NONDISPLACED INTERTROCHANTERIC FRACTURE OF LEFT FEMUR WITH ROUTINE HEALING, SUBSEQUENT ENCOUNTER: ICD-10-CM

## 2024-04-22 DIAGNOSIS — D62 ACUTE BLOOD LOSS AS CAUSE OF POSTOPERATIVE ANEMIA: ICD-10-CM

## 2024-04-22 PROCEDURE — 99316 NF DSCHRG MGMT 30 MIN+: CPT | Performed by: PHYSICIAN ASSISTANT

## 2024-04-22 NOTE — PROGRESS NOTES
[facility]  [address] [address]  [phone]  [facility]  Code 31  Discharge note      NAME: Pebbles Carreon  AGE: 83 y.o. SEX: female [phone]    DATE OF ENCOUNTER: 4/22/2024    CODE STATUS: DNR    Assessment and Plan     Problem List Items Addressed This Visit          Cardiovascular and Mediastinum    Chronic heart failure with preserved ejection fraction (HCC) - Primary     Wt Readings from Last 3 Encounters:   04/22/24 83.4 kg (183 lb 12.8 oz)   04/18/24 84 kg (185 lb 1.6 oz)   04/09/24 84.1 kg (185 lb 6.4 oz)     Weight stable  Continue current dose lasix               Essential (primary) hypertension     BP stable  Continue metoprolol            Digestive    Gastroesophageal reflux disease without esophagitis     Continue PPI            Endocrine    Hypothyroid     Continue levothyroxine            Musculoskeletal and Integument    Closed nondisplaced intertrochanteric fracture of left femur (HCC)     S/p IM chris fixation 2/29/24  Did have continued bloody drainage from her hip incision. Completed a course of doxycyline earlier in the month as ordered by ortho  Also had a small area of her incision cauterized in the office at her most recent ortho appt  Now having no drainage  Doing well with therapy  WBAT  Planning discharge home 4/23 with home care services  The following services are medically necessary at the time of discharge:  1- nursing for medication management, wound checks  2- PT for gait and balance  3- OT for self care and ADL's            Blood    Acute blood loss as cause of postoperative anemia     Improving  Most recent HGB 12.3 on 4/15/24  Continue iron supplement            Care Coordination    DNR (do not resuscitate)       Other    Hyperlipidemia     Continue statin            No orders of the defined types were placed in this encounter.          Chief Complaint     Chief Complaint   Patient presents with    Geriatric  Evaluation     Discharge note       History of Present Illness   83 year old female being seen today in collaboration with nursing for follow up prior to her discharge home tomorrow. She was admitted to [facility] for rehab after a hip fracture in February. She has done well with therapy. She did have bloody drainage from her hip incision and was given a course of doxycycline by ortho which is now completed. She is eager to be discharged home         The following portions of the patient's history were reviewed and updated as appropriate: allergies, current medications, past family history, past medical history, past social history, past surgical history and problem list.    Review of Systems   Review of Systems   Constitutional: Negative.    HENT: Negative.     Eyes: Negative.    Respiratory: Negative.     Cardiovascular: Negative.    Gastrointestinal: Negative.    Endocrine: Negative.    Genitourinary: Negative.    Musculoskeletal:  Positive for arthralgias.   Skin: Negative.    Hematological: Negative.    Psychiatric/Behavioral: Negative.            Active Problem List     Patient Active Problem List   Diagnosis    Chronic heart failure with preserved ejection fraction (HCC)    Coronary artery disease    History of seizures    History of vertebral fracture    Vitamin D deficiency    Gastroesophageal reflux disease without esophagitis    IFG (impaired fasting glucose)    Hyperlipidemia    Hypothyroid    COPD (chronic obstructive pulmonary disease) (HCC)    History of subdural hematoma    Acute blood loss as cause of postoperative anemia    Closed nondisplaced intertrochanteric fracture of left femur (HCC)    Essential (primary) hypertension    Insomnia    DNR (do not resuscitate)    Diuretic-induced hypokalemia    Diastolic dysfunction    Elevated LFTs         Objective     BP (!) 115/46   Pulse 63   Temp 98 °F (36.7 °C)   Resp 18   Wt 83.4 kg (183 lb 12.8 oz)     Physical Exam  Vitals reviewed.    Constitutional:       General: She is not in acute distress.     Appearance: She is not ill-appearing or diaphoretic.   HENT:      Head: Normocephalic and atraumatic.      Nose: Nose normal.      Mouth/Throat:      Pharynx: Oropharynx is clear.   Cardiovascular:      Rate and Rhythm: Normal rate.   Pulmonary:      Effort: Pulmonary effort is normal. No respiratory distress.      Breath sounds: Normal breath sounds.   Abdominal:      General: Bowel sounds are normal. There is no distension.      Palpations: Abdomen is soft.      Tenderness: There is no abdominal tenderness.   Musculoskeletal:         General: No deformity or signs of injury.      Right lower leg: Edema present.      Left lower leg: Edema present.      Comments: Hip incisions well healed, no drainage   Skin:     General: Skin is warm and dry.      Findings: No bruising or erythema.   Neurological:      Mental Status: She is alert and oriented to person, place, and time.   Psychiatric:         Mood and Affect: Mood normal.         Behavior: Behavior normal.         Pertinent Laboratory/Diagnostic Studies:    4/15/24  Hgb 12.3  Wbc 8.8  Creat 0.86    K 3.7    Current Medications   Medications reviewed and updated in facility chart.    I have spent a total time of 60 minutes on 04/22/24 in caring for this patient including Instructions for management, Patient and family education, Impressions, Counseling / Coordination of care, Documenting in the medical record, Reviewing / ordering tests, medicine, procedures  , and Obtaining or reviewing history  .

## 2024-04-22 NOTE — ASSESSMENT & PLAN NOTE
Wt Readings from Last 3 Encounters:   04/22/24 83.4 kg (183 lb 12.8 oz)   04/18/24 84 kg (185 lb 1.6 oz)   04/09/24 84.1 kg (185 lb 6.4 oz)     Weight stable  Continue current dose lasix

## 2024-04-22 NOTE — ASSESSMENT & PLAN NOTE
S/p IM chris fixation 2/29/24  Did have continued bloody drainage from her hip incision. Completed a course of doxycyline earlier in the month as ordered by ortho  Also had a small area of her incision cauterized in the office at her most recent ortho appt  Now having no drainage  Doing well with therapy  WBAT  Planning discharge home 4/23 with home care services  The following services are medically necessary at the time of discharge:  1- nursing for medication management, wound checks  2- PT for gait and balance  3- OT for self care and ADL's

## 2024-04-23 NOTE — ASSESSMENT & PLAN NOTE
Secondary to chronic furosemide use for her heart failure history  Will increase her oral potassium supplement to 20 mEq twice daily as I am increasing her furosemide to twice daily  Will continue with periodic laboratory monitoring for change in her condition

## 2024-04-23 NOTE — PROGRESS NOTES
[facility]  [address]  [place] [address]  Facility: [facility]  [place] [address]    NAME: Pebbles Carreon  AGE: 83 y.o. SEX: female    DATE OF ENCOUNTER: April 11, 2024    Code status:   DNR    Assessment and Plan     1. Chronic heart failure with preserved ejection fraction (HCC)  Assessment & Plan:  Despite maintaining her weight between the goal of 180 pounds and her 90 pounds, she has significant bilateral lower extremity edema  Will give an extra dose of furosemide 40 mg and potassium chloride 20 mEq today and increase both to twice daily starting tomorrow  Will order follow-up laboratory testing  Will continue with monitoring for change in her condition              2. Diuretic-induced hypokalemia  Assessment & Plan:  Secondary to chronic furosemide use for her heart failure history  Will increase her oral potassium supplement to 20 mEq twice daily as I am increasing her furosemide to twice daily  Will continue with periodic laboratory monitoring for change in her condition          All medications and routine orders were reviewed and updated as needed.    Plan discussed with: Nursing staff, patient, and patient's sister is present during the visit.    Chief Complaint     Leg swelling    History of Present Illness     She is a pleasant 83-year-old woman who is seen with nursing staff secondary to leg swelling.  She has a history of coronary artery disease, chronic heart failure with preserved ejection fraction, acute blood loss anemia postoperatively, diuretic induced hypokalemia, hypothyroidism, hypertension, history of seizures, and recent left femur fracture.  She denies shortness of breath.  She endorses having swelling in her legs.    The following portions of the patient's history were reviewed and updated as appropriate: current medications, past family history, past medical history, past social history, past surgical history  "and problem list.    Allergies:  Allergies   Allergen Reactions    Oxycodone Delirium    Pe[place] [Prunus Persica] Hives    Hydrocodone-Acetaminophen Anxiety    Morphine Anxiety       Review of Systems     Review of Systems   Respiratory:  Negative for shortness of breath.    Cardiovascular:  Positive for leg swelling.       Medications and orders     All [facility] and [facility].      Objective     Vitals: Recent vitals: Weight 185.6 pounds, pulse 63, blood pressure 115/46.    Physical Exam  Vitals reviewed. Exam conducted with a chaperone present.   Constitutional:       General: She is awake. She is not in acute distress.     Appearance: She is well-developed. She is not toxic-appearing or diaphoretic.   Cardiovascular:      Comments: 4+ bilateral pretibial edema  Neurological:      Mental Status: She is alert.   Psychiatric:         Mood and Affect: Mood normal.         Behavior: Behavior normal. Behavior is cooperative.       - New orders reviewed with patient and nursing staff.      Portions of the record may have been created with voice recognition software.  Occasional wrong word or \"sound a like\" substitutions may have occurred due to the inherent limitations of voice recognition software.  Read the chart carefully and recognize, using context, where substitutions have occurred.    Darius Mccurdy M.D.  4/23/2024 7:44 AM      "

## 2024-04-23 NOTE — ASSESSMENT & PLAN NOTE
Despite maintaining her weight between the goal of 180 pounds and her 90 pounds, she has significant bilateral lower extremity edema  Will give an extra dose of furosemide 40 mg and potassium chloride 20 mEq today and increase both to twice daily starting tomorrow  Will order follow-up laboratory testing  Will continue with monitoring for change in her condition

## 2024-11-26 ENCOUNTER — APPOINTMENT (EMERGENCY)
Dept: CT IMAGING | Facility: HOSPITAL | Age: 84
DRG: 305 | End: 2024-11-26
Payer: MEDICARE

## 2024-11-26 ENCOUNTER — HOSPITAL ENCOUNTER (INPATIENT)
Facility: HOSPITAL | Age: 84
LOS: 1 days | Discharge: HOME | DRG: 305 | End: 2024-11-27
Attending: EMERGENCY MEDICINE | Admitting: INTERNAL MEDICINE
Payer: MEDICARE

## 2024-11-26 ENCOUNTER — APPOINTMENT (EMERGENCY)
Dept: RADIOLOGY | Facility: HOSPITAL | Age: 84
DRG: 305 | End: 2024-11-26
Payer: MEDICARE

## 2024-11-26 DIAGNOSIS — Z87.898 HISTORY OF SEIZURES: ICD-10-CM

## 2024-11-26 DIAGNOSIS — Z86.79 HISTORY OF SUBDURAL HEMATOMA: ICD-10-CM

## 2024-11-26 DIAGNOSIS — R42 DIZZY: Primary | ICD-10-CM

## 2024-11-26 PROBLEM — I16.0 HYPERTENSIVE URGENCY: Status: ACTIVE | Noted: 2024-03-11

## 2024-11-26 LAB
2HR DELTA HS TROPONIN: 0 NG/L
ANION GAP SERPL CALCULATED.3IONS-SCNC: 6 MMOL/L (ref 4–13)
APTT PPP: 23 SECONDS (ref 23–34)
ATRIAL RATE: 52 BPM
BUN SERPL-MCNC: 14 MG/DL (ref 5–25)
CALCIUM SERPL-MCNC: 9.7 MG/DL (ref 8.4–10.2)
CARDIAC TROPONIN I PNL SERPL HS: 4 NG/L (ref ?–50)
CARDIAC TROPONIN I PNL SERPL HS: 4 NG/L (ref ?–50)
CHLORIDE SERPL-SCNC: 105 MMOL/L (ref 96–108)
CO2 SERPL-SCNC: 27 MMOL/L (ref 21–32)
CREAT SERPL-MCNC: 0.81 MG/DL (ref 0.6–1.3)
ERYTHROCYTE [DISTWIDTH] IN BLOOD BY AUTOMATED COUNT: 12.6 % (ref 11.6–15.1)
GFR SERPL CREATININE-BSD FRML MDRD: 67 ML/MIN/1.73SQ M
GLUCOSE SERPL-MCNC: 88 MG/DL (ref 65–140)
GLUCOSE SERPL-MCNC: 97 MG/DL (ref 65–140)
HCT VFR BLD AUTO: 39.5 % (ref 34.8–46.1)
HGB BLD-MCNC: 12.9 G/DL (ref 11.5–15.4)
INR PPP: 0.89 (ref 0.85–1.19)
MCH RBC QN AUTO: 29.7 PG (ref 26.8–34.3)
MCHC RBC AUTO-ENTMCNC: 32.7 G/DL (ref 31.4–37.4)
MCV RBC AUTO: 91 FL (ref 82–98)
P AXIS: 73 DEGREES
PLATELET # BLD AUTO: 174 THOUSANDS/UL (ref 149–390)
PMV BLD AUTO: 10.9 FL (ref 8.9–12.7)
POTASSIUM SERPL-SCNC: 5.1 MMOL/L (ref 3.5–5.3)
PR INTERVAL: 274 MS
PROTHROMBIN TIME: 12.8 SECONDS (ref 12.3–15)
QRS AXIS: -20 DEGREES
QRSD INTERVAL: 92 MS
QT INTERVAL: 446 MS
QTC INTERVAL: 414 MS
RBC # BLD AUTO: 4.34 MILLION/UL (ref 3.81–5.12)
SODIUM SERPL-SCNC: 138 MMOL/L (ref 135–147)
T WAVE AXIS: 96 DEGREES
T4 FREE SERPL-MCNC: 1.43 NG/DL (ref 0.61–1.12)
TSH SERPL DL<=0.05 MIU/L-ACNC: 0.04 UIU/ML (ref 0.45–4.5)
VENTRICULAR RATE: 52 BPM
WBC # BLD AUTO: 8 THOUSAND/UL (ref 4.31–10.16)

## 2024-11-26 PROCEDURE — 99285 EMERGENCY DEPT VISIT HI MDM: CPT | Performed by: EMERGENCY MEDICINE

## 2024-11-26 PROCEDURE — 80048 BASIC METABOLIC PNL TOTAL CA: CPT | Performed by: EMERGENCY MEDICINE

## 2024-11-26 PROCEDURE — 99285 EMERGENCY DEPT VISIT HI MDM: CPT

## 2024-11-26 PROCEDURE — 85027 COMPLETE CBC AUTOMATED: CPT | Performed by: EMERGENCY MEDICINE

## 2024-11-26 PROCEDURE — 99223 1ST HOSP IP/OBS HIGH 75: CPT | Performed by: STUDENT IN AN ORGANIZED HEALTH CARE EDUCATION/TRAINING PROGRAM

## 2024-11-26 PROCEDURE — 82948 REAGENT STRIP/BLOOD GLUCOSE: CPT

## 2024-11-26 PROCEDURE — 36415 COLL VENOUS BLD VENIPUNCTURE: CPT | Performed by: EMERGENCY MEDICINE

## 2024-11-26 PROCEDURE — 85730 THROMBOPLASTIN TIME PARTIAL: CPT | Performed by: EMERGENCY MEDICINE

## 2024-11-26 PROCEDURE — 71045 X-RAY EXAM CHEST 1 VIEW: CPT

## 2024-11-26 PROCEDURE — 84484 ASSAY OF TROPONIN QUANT: CPT | Performed by: EMERGENCY MEDICINE

## 2024-11-26 PROCEDURE — 93005 ELECTROCARDIOGRAM TRACING: CPT

## 2024-11-26 PROCEDURE — 70498 CT ANGIOGRAPHY NECK: CPT

## 2024-11-26 PROCEDURE — 85610 PROTHROMBIN TIME: CPT | Performed by: EMERGENCY MEDICINE

## 2024-11-26 PROCEDURE — 70496 CT ANGIOGRAPHY HEAD: CPT

## 2024-11-26 PROCEDURE — 84443 ASSAY THYROID STIM HORMONE: CPT | Performed by: PHYSICIAN ASSISTANT

## 2024-11-26 PROCEDURE — 93010 ELECTROCARDIOGRAM REPORT: CPT | Performed by: INTERNAL MEDICINE

## 2024-11-26 PROCEDURE — 84439 ASSAY OF FREE THYROXINE: CPT | Performed by: PHYSICIAN ASSISTANT

## 2024-11-26 RX ORDER — ONDANSETRON 2 MG/ML
4 INJECTION INTRAMUSCULAR; INTRAVENOUS EVERY 6 HOURS PRN
Status: DISCONTINUED | OUTPATIENT
Start: 2024-11-26 | End: 2024-11-27 | Stop reason: HOSPADM

## 2024-11-26 RX ORDER — METOPROLOL SUCCINATE 50 MG/1
50 TABLET, EXTENDED RELEASE ORAL DAILY
Status: DISCONTINUED | OUTPATIENT
Start: 2024-11-27 | End: 2024-11-27 | Stop reason: HOSPADM

## 2024-11-26 RX ORDER — FUROSEMIDE 20 MG/1
20 TABLET ORAL
Status: DISCONTINUED | OUTPATIENT
Start: 2024-11-27 | End: 2024-11-27 | Stop reason: HOSPADM

## 2024-11-26 RX ORDER — MAGNESIUM HYDROXIDE/ALUMINUM HYDROXICE/SIMETHICONE 120; 1200; 1200 MG/30ML; MG/30ML; MG/30ML
30 SUSPENSION ORAL EVERY 6 HOURS PRN
Status: DISCONTINUED | OUTPATIENT
Start: 2024-11-26 | End: 2024-11-27 | Stop reason: HOSPADM

## 2024-11-26 RX ORDER — FUROSEMIDE 40 MG/1
20 TABLET ORAL ONCE
Status: COMPLETED | OUTPATIENT
Start: 2024-11-26 | End: 2024-11-26

## 2024-11-26 RX ORDER — OFLOXACIN 3 MG/ML
1 SOLUTION/ DROPS OPHTHALMIC 4 TIMES DAILY
COMMUNITY
End: 2024-11-27 | Stop reason: ALTCHOICE

## 2024-11-26 RX ORDER — PANTOPRAZOLE SODIUM 20 MG/1
20 TABLET, DELAYED RELEASE ORAL DAILY
Status: DISCONTINUED | OUTPATIENT
Start: 2024-11-27 | End: 2024-11-27 | Stop reason: HOSPADM

## 2024-11-26 RX ORDER — LEVOTHYROXINE SODIUM 125 UG/1
125 TABLET ORAL
Status: DISCONTINUED | OUTPATIENT
Start: 2024-11-27 | End: 2024-11-27

## 2024-11-26 RX ORDER — POTASSIUM CHLORIDE 1500 MG/1
20 TABLET, EXTENDED RELEASE ORAL 2 TIMES DAILY
Status: DISCONTINUED | OUTPATIENT
Start: 2024-11-26 | End: 2024-11-26 | Stop reason: RX

## 2024-11-26 RX ORDER — BROMFENAC SODIUM 0.7 MG/ML
1 SOLUTION/ DROPS OPHTHALMIC DAILY
COMMUNITY
Start: 2024-09-03

## 2024-11-26 RX ORDER — ASPIRIN 81 MG/1
81 TABLET, CHEWABLE ORAL DAILY
Status: DISCONTINUED | OUTPATIENT
Start: 2024-11-27 | End: 2024-11-27 | Stop reason: HOSPADM

## 2024-11-26 RX ORDER — FUROSEMIDE 40 MG/1
40 TABLET ORAL
Status: DISCONTINUED | OUTPATIENT
Start: 2024-11-26 | End: 2024-11-26

## 2024-11-26 RX ORDER — PRAVASTATIN SODIUM 40 MG
40 TABLET ORAL
Status: DISCONTINUED | OUTPATIENT
Start: 2024-11-26 | End: 2024-11-27 | Stop reason: HOSPADM

## 2024-11-26 RX ORDER — ACETAMINOPHEN 325 MG/1
650 TABLET ORAL EVERY 6 HOURS PRN
Status: DISCONTINUED | OUTPATIENT
Start: 2024-11-26 | End: 2024-11-27 | Stop reason: HOSPADM

## 2024-11-26 RX ORDER — KETOROLAC TROMETHAMINE 5 MG/ML
1 SOLUTION OPHTHALMIC 4 TIMES DAILY
COMMUNITY
Start: 2024-10-11

## 2024-11-26 RX ORDER — POLYETHYLENE GLYCOL 3350 17 G/17G
17 POWDER, FOR SOLUTION ORAL DAILY PRN
Status: DISCONTINUED | OUTPATIENT
Start: 2024-11-26 | End: 2024-11-27 | Stop reason: HOSPADM

## 2024-11-26 RX ORDER — PREDNISOLONE ACETATE 10 MG/ML
1 SUSPENSION/ DROPS OPHTHALMIC 4 TIMES DAILY
COMMUNITY

## 2024-11-26 RX ORDER — POTASSIUM CHLORIDE 1500 MG/1
20 TABLET, EXTENDED RELEASE ORAL 2 TIMES DAILY
Status: DISCONTINUED | OUTPATIENT
Start: 2024-11-26 | End: 2024-11-27 | Stop reason: HOSPADM

## 2024-11-26 RX ADMIN — IOHEXOL 85 ML: 350 INJECTION, SOLUTION INTRAVENOUS at 11:59

## 2024-11-26 RX ADMIN — PRAVASTATIN SODIUM 40 MG: 40 TABLET ORAL at 18:23

## 2024-11-26 RX ADMIN — FUROSEMIDE 20 MG: 40 TABLET ORAL at 18:23

## 2024-11-26 RX ADMIN — Medication 6 MG: at 21:25

## 2024-11-26 NOTE — ED PROVIDER NOTES
Time reflects when diagnosis was documented in both MDM as applicable and the Disposition within this note       Time User Action Codes Description Comment    11/26/2024 11:47 AM Ree Ventura Add [R42] Dizzy     11/26/2024  1:59 PM Yelena Antonio [Z87.898] History of seizures     11/26/2024  1:59 PM Yelena Antonio [Z86.79] History of subdural hematoma           ED Disposition       ED Disposition   Admit    Condition   Stable    Date/Time   Tue Nov 26, 2024  1:51 PM    Comment                  Assessment & Plan       Medical Decision Making  84M female is coming in with complaint of dizziness that started this morning when she went and tilted her head back to put in her eyedrops.  Had it for a few minutes and then took shower and resolved.  Then when she went to the hairdresser and put her head back again dizziness started again.  No associated vision change or speech change or arm numbness or leg weakness.  She had no chest pain or shortness of breath.  When the dizziness symptoms persisted was sent in by EMS.  Patient does have history of having a head bleed after a fall and seizures and then upon evaluation was told she had history of having prior small strokes but none were once that she was aware of.  Has not really had significant vertigo in the past.  Given new onset of dizziness likely with head movement positional vertigo.  However given age and blood pressure and significant history of prior strokes and head bleeds stroke alert called.  CTA ordered along with EKG and stroke alert labs including CBC CHEM and troponins.  Will discuss with neurology attempt to treat symptoms and reassess patient about admission versus treatment for potential peripheral vertigo.    Amount and/or Complexity of Data Reviewed  Labs: ordered.  Radiology: ordered.  ECG/medicine tests: ordered and independent interpretation performed.    Risk  Prescription drug management.  Decision regarding hospitalization.              Medications   aspirin chewable tablet 81 mg (has no administration in time range)   pantoprazole (PROTONIX) EC tablet 20 mg (has no administration in time range)   magnesium chloride (MAG64) EC tablet TBEC 64 mg (has no administration in time range)   metoprolol succinate (TOPROL-XL) 24 hr tablet 50 mg ( Oral Held Dose 11/30/24 0900)   levothyroxine tablet 125 mcg (has no administration in time range)   pravastatin (PRAVACHOL) tablet 40 mg (40 mg Oral Given 11/26/24 1823)   Artificial Tears Op Soln 2 drop (has no administration in time range)   acetaminophen (TYLENOL) tablet 650 mg (has no administration in time range)   polyethylene glycol (MIRALAX) packet 17 g (has no administration in time range)   ondansetron (ZOFRAN) injection 4 mg (has no administration in time range)   aluminum-magnesium hydroxide-simethicone (MAALOX) oral suspension 30 mL (has no administration in time range)   melatonin tablet 6 mg (has no administration in time range)   potassium chloride (Klor-Con M20) CR tablet 20 mEq ( Oral Unheld by provider 11/28/24 0859)   furosemide (LASIX) tablet 20 mg (has no administration in time range)   iohexol (OMNIPAQUE) 350 MG/ML injection (MULTI-DOSE) 85 mL (85 mL Intravenous Given 11/26/24 1159)   furosemide (LASIX) tablet 20 mg (20 mg Oral Given 11/26/24 1823)       ED Risk Strat Scores       Stroke Assessment       Row Name 11/26/24 1202             NIH Stroke Scale    Interval Baseline      Level of Consciousness (1a.) 0      LOC Questions (1b.) 0      LOC Commands (1c.) 0      Best Gaze (2.) 0      Visual (3.) 0      Facial Palsy (4.) 0      Motor Arm, Left (5a.) 0      Motor Arm, Right (5b.) 0      Motor Leg, Left (6a.) 0      Motor Leg, Right (6b.) 0      Limb Ataxia (7.) 0      Sensory (8.) 0      Best Language (9.) 0      Dysarthria (10.) 0      Extinction and Inattention (11.) (Formerly Neglect) 0      Total 0                    Flowsheet Row Most Recent Value   Thrombolytic Decision Options   "  Thrombolytic Decision Patient not a candidate.   Patient is not a candidate options Symptoms resolved/clearly non disabling.                            SBIRT 20yo+      Flowsheet Row Most Recent Value   Initial Alcohol Screen: US AUDIT-C     1. How often do you have a drink containing alcohol? 0 Filed at: 11/26/2024 1140   2. How many drinks containing alcohol do you have on a typical day you are drinking?  0 Filed at: 11/26/2024 1140   3b. FEMALE Any Age, or MALE 65+: How often do you have 4 or more drinks on one occassion? 0 Filed at: 11/26/2024 1140   Audit-C Score 0 Filed at: 11/26/2024 1140   REGIS: How many times in the past year have you...    Used an illegal drug or used a prescription medication for non-medical reasons? Never Filed at: 11/26/2024 1140                            History of Present Illness       Chief Complaint   Patient presents with    Dizziness     Dizziness starting this AM at 0800 after putting head back to put eyedrops in. PT states \"it felt like the room was spinning.\" Symptoms resolved. PT then went to  and when PT put head back for washing, began feeling dizziness again. C/o of front right headache, denies current dizziness. PT states \"a week ago I was having R ear pain that radiated down R side of neck but those symptoms have resolved.\"  Currently alert and oriented x4. L facial defecit from previous stroke. B/l perforated ear d       Past Medical History:   Diagnosis Date    Stroke (HCC)     Vomiting 03/21/2024      Past Surgical History:   Procedure Laterality Date    CORONARY ARTERY BYPASS GRAFT  2012    X 4    CRANIOTOMY  04/10/2022    Right-sided frontal and parietal bur holes with evacuation of subdural hematoma    TOTAL SHOULDER ARTHROPLASTY Right 03/24/2022      Family History   Problem Relation Age of Onset    Leukemia Mother     Aneurysm Father         Brain    Thyroid disease Sister     Heart disease Sister     Thyroid disease Brother     Prostate cancer " Brother       Social History     Tobacco Use    Smoking status: Never    Smokeless tobacco: Never   Vaping Use    Vaping status: Never Used   Substance Use Topics    Alcohol use: Never    Drug use: Never      E-Cigarette/Vaping    E-Cigarette Use Never User       E-Cigarette/Vaping Substances      I have reviewed and agree with the history as documented.       History provided by:  Patient  Dizziness  Quality:  Head spinning  Severity:  Moderate  Onset quality:  Sudden  Duration:  4 hours  Timing:  Constant  Progression:  Waxing and waning  Chronicity:  New  Context: ear pain and head movement    Context: not with eye movement, not with loss of consciousness, not with medication, not with physical activity, not when standing up and not when urinating    Relieved by:  Nothing  Worsened by:  Nothing  Ineffective treatments:  None tried  Associated symptoms: no blood in stool, no chest pain, no diarrhea, no nausea, no palpitations, no shortness of breath, no syncope, no tinnitus, no vision changes, no vomiting and no weakness        Review of Systems   HENT:  Negative for tinnitus.    Respiratory:  Negative for shortness of breath.    Cardiovascular:  Negative for chest pain, palpitations and syncope.   Gastrointestinal:  Negative for blood in stool, diarrhea, nausea and vomiting.   Neurological:  Positive for dizziness. Negative for weakness.   All other systems reviewed and are negative.          Objective       ED Triage Vitals   Temperature Pulse Blood Pressure Respirations SpO2 Patient Position - Orthostatic VS   11/26/24 1130 11/26/24 1130 11/26/24 1130 11/26/24 1130 11/26/24 1155 11/26/24 1140   98 °F (36.7 °C) (!) 50 (!) 176/78 16 97 % Lying      Temp Source Heart Rate Source BP Location FiO2 (%) Pain Score    11/26/24 1130 11/26/24 1130 11/26/24 1140 -- --    Oral Monitor Right arm        Vitals      Date and Time Temp Pulse SpO2 Resp BP Pain Score FACES Pain Rating User   11/26/24 2000 -- 57 94 % 16 148/66 --  -- SG   11/26/24 1900 -- 76 92 % 47 184/81 -- -- SG   11/26/24 1815 -- 52 95 % 16 160/73 -- -- SG   11/26/24 1730 -- 50 97 % 20 159/70 -- -- AM   11/26/24 1700 -- 51 97 % 24 151/66 -- -- SG   11/26/24 1645 -- 50 96 % 18 151/65 -- -- AM   11/26/24 1600 -- -- -- 12 136/65 -- -- AM   11/26/24 1530 -- 58 97 % 18 182/73 -- -- AM   11/26/24 1500 -- 48 97 % 22 164/72 -- -- AM   11/26/24 1445 -- 54 97 % 20 201/81 -- -- AM   11/26/24 1415 -- 48 99 % 18 178/75 -- -- AM   11/26/24 1400 -- 47 99 % 21 188/79 -- -- SG   11/26/24 1345 -- 48 100 % 19 191/79 -- -- AM   11/26/24 1330 -- 48 98 % 18 195/80 -- -- AM   11/26/24 1315 -- 48 98 % 20 188/82 -- -- AM   11/26/24 1300 98 °F (36.7 °C) 48 98 % 18 192/83 -- -- AM   11/26/24 1245 -- -- -- 18 -- -- -- AM   11/26/24 1245 -- 49 99 % -- 215/85 -- -- SB   11/26/24 1230 -- 51 99 % 18 190/80 -- -- AM   11/26/24 1215 -- -- -- 20 -- -- -- SG   11/26/24 1215 -- 52 99 % -- 202/81 -- -- SB   11/26/24 1200 98 °F (36.7 °C) 56 98 % 18 199/79 -- -- AM   11/26/24 1155 -- 50 97 % -- -- -- -- AM   11/26/24 1140 98 °F (36.7 °C) 52 -- 18 176/78 -- -- AM   11/26/24 1130 98 °F (36.7 °C) 50 -- 16 176/78 -- -- SG            Physical Exam  Vitals and nursing note reviewed.   Constitutional:       General: She is not in acute distress.     Appearance: She is well-developed. She is not diaphoretic.   HENT:      Head: Normocephalic and atraumatic.      Nose: Nose normal.      Mouth/Throat:      Mouth: Mucous membranes are moist.   Eyes:      Extraocular Movements: Extraocular movements intact.      Conjunctiva/sclera: Conjunctivae normal.   Cardiovascular:      Rate and Rhythm: Regular rhythm. Bradycardia present.      Heart sounds: Normal heart sounds.   Pulmonary:      Effort: Pulmonary effort is normal. No respiratory distress.      Breath sounds: Normal breath sounds.   Abdominal:      General: There is no distension.      Palpations: Abdomen is soft.      Tenderness: There is no abdominal tenderness.    Musculoskeletal:         General: No deformity. Normal range of motion.      Cervical back: Neck supple.   Skin:     General: Skin is warm and dry.   Neurological:      General: No focal deficit present.      Mental Status: She is alert and oriented to person, place, and time. Mental status is at baseline.      Cranial Nerves: Cranial nerve deficit (left facial droop at baseline, dizziness provoked with head movement, none at rest) present.      Motor: No weakness.   Psychiatric:         Mood and Affect: Mood normal.         Results Reviewed       Procedure Component Value Units Date/Time    TSH, 3rd generation with Free T4 reflex [[id number]]  (Abnormal) Collected: 11/26/24 1153    Lab Status: Final result Specimen: Blood from Arm, Left Updated: 11/26/24 1719     TSH 3RD GENERATON 0.041 uIU/mL     T4, free [[id number]] Collected: 11/26/24 1153    Lab Status: In process Specimen: Blood from Arm, Left Updated: 11/26/24 1719    HS Troponin I 2hr [[id number]]  (Normal) Collected: 11/26/24 1330    Lab Status: Final result Specimen: Blood from Arm, Left Updated: 11/26/24 1408     hs TnI 2hr 4 ng/L      Delta 2hr hsTnI 0 ng/L     Protime-INR [[id number]]  (Normal) Collected: 11/26/24 1330    Lab Status: Final result Specimen: Blood from Arm, Left Updated: 11/26/24 1358     Protime 12.8 seconds      INR 0.89    Narrative:      INR Therapeutic Range    Indication                                             INR Range      Atrial Fibrillation                                               2.0-3.0  Hypercoagulable State                                    2.0.2.3  Left Ventricular Asist Device                            2.0-3.0  Mechanical Heart Valve                                  -    Aortic(with afib, MI, embolism, HF, LA enlargement,    and/or coagulopathy)                                     2.0-3.0 (2.5-3.5)     Mitral                                                             2.5-3.5  Prosthetic/Bioprosthetic Heart Valve                2.0-3.0  Venous thromboembolism (VTE: VT, PE        2.0-3.0    APTT [[id number]]  (Normal) Collected: 11/26/24 1330    Lab Status: Final result Specimen: Blood from Arm, Left Updated: 11/26/24 1358     PTT 23 seconds     HS Troponin 0hr (reflex protocol) [[id number]]  (Normal) Collected: 11/26/24 1153    Lab Status: Final result Specimen: Blood from Arm, Left Updated: 11/26/24 1236     hs TnI 0hr 4 ng/L     Basic metabolic panel [[id number]] Collected: 11/26/24 1153    Lab Status: Final result Specimen: Blood from Arm, Left Updated: 11/26/24 1227     Sodium 138 mmol/L      Potassium 5.1 mmol/L      Chloride 105 mmol/L      CO2 27 mmol/L      ANION GAP 6 mmol/L      BUN 14 mg/dL      Creatinine 0.81 mg/dL      Glucose 88 mg/dL      Calcium 9.7 mg/dL      eGFR 67 ml/min/1.73sq m     Narrative:      [employer] guidelines for Chronic Kidney Disease (CKD):     Stage 1 with normal or high GFR (GFR > 90 mL/min/1.73 square meters)    Stage 2 Mild CKD (GFR = 60-89 mL/min/1.73 square meters)    Stage 3A Moderate CKD (GFR = 45-59 mL/min/1.73 square meters)    Stage 3B Moderate CKD (GFR = 30-44 mL/min/1.73 square meters)    Stage 4 Severe CKD (GFR = 15-29 mL/min/1.73 square meters)    Stage 5 End Stage CKD (GFR <15 mL/min/1.73 square meters)  Note: GFR calculation is accurate only with a steady state creatinine    CBC and Platelet [[id number]]  (Normal) Collected: 11/26/24 1153    Lab Status: Final result Specimen: Blood from Arm, Left Updated: 11/26/24 1213     WBC 8.00 Thousand/uL      RBC 4.34 Million/uL      Hemoglobin 12.9 g/dL      Hematocrit 39.5 %      MCV 91 fL      MCH 29.7 pg      MCHC 32.7 g/dL      RDW 12.6 %      Platelets 174 Thousands/uL      MPV 10.9 fL     Fingerstick Glucose (POCT) [[id number]]  (Normal) Collected: 11/26/24 1129    Lab Status: Final result Specimen: Blood Updated: 11/26/24 1130     POC Glucose 97 mg/dl             XR stroke alert portable chest   Final  Interpretation by Basim Montemayor MD (11/26 1213)      Bibasilar subsegmental atelectasis (right worse than left). No focal consolidation.      The study was marked in EPIC for immediate notification.         Workstation performed: [id number]         CTA stroke alert (head/neck)   Final Interpretation by Akin Pena DO (11/26 1220)      No occlusion of the cervical or intracranial vasculature.      Mild stenosis of the left vertebral artery origin.      Findings were directly discussed with Zane Huynh at 12:18 p.m.      Workstation performed: [id number]         CT stroke alert brain   Final Interpretation by Akin Pena DO (11/26 1221)      No acute intracranial abnormality.      Old infarcts within the right hemisphere with moderate chronic microangiopathic change noted in the periventricular white matter.      Findings were directly discussed with Zane Huynh at approximately 12:18 p.m.      Workstation performed: [id number]         MRI Inpatient Order    (Results Pending)       ECG 12 Lead Documentation Only    Date/Time: 11/26/2024 12:09 PM    Performed by: Ree Ventura MD  Authorized by: Ree Ventura MD    Indications / Diagnosis:  Dizzy  ECG reviewed by me, the ED Provider: yes    Patient location:  ED  Rate:     ECG rate:  52    ECG rate assessment: bradycardic    Rhythm:     Rhythm: A-V block    ST segments:     ST segments:  Normal  T waves:     T waves: normal        ED Medication and Procedure Management   Prior to Admission Medications   Prescriptions Last Dose Informant Patient Reported? Taking?   Cholecalciferol 50 MCG (2000 UT) TABS   Yes No   Sig: Take 1 tablet by mouth in the morning   Melatonin 5 MG TABS   Yes No   Sig: Take 5 mg by mouth daily at bedtime   Potassium Chloride ER 20 MEQ TBCR   Yes No   Sig: Take 20 mEq by mouth 2 (two) times a day   Prolensa 0.07 % SOLN   Yes Yes   Sig: Apply 1 drop to eye in the morning   Propylene Glycol,  PF, (Systane Complete PF) 0.6 % SOLN   Yes No   Sig: Apply 1 drop to eye every 8 (eight) hours as needed (Both eyes)   ascorbic acid (VITAMIN C) 500 mg tablet   Yes No   Sig: Take 500 mg by mouth daily   aspirin 81 mg chewable tablet   Yes No   Sig: Chew 81 mg daily Do not start before March 30, 2024.   ferrous sulfate 325 (65 Fe) mg tablet   Yes No   Sig: Take 325 mg by mouth daily with breakfast   furosemide (LASIX) 40 mg tablet   Yes No   Sig: Take 40 mg by mouth 2 (two) times a day   ketorolac (ACULAR) 0.5 % ophthalmic solution   Yes Yes   Sig: Administer 1 drop to both eyes 4 (four) times a day   levothyroxine 125 mcg tablet   Yes No   Sig: Take 125 mcg by mouth daily   magnesium chloride (MAG64) 64 MG TBEC EC tablet   Yes No   Sig: Take 64 mg by mouth daily   metoprolol succinate (TOPROL-XL) 50 mg 24 hr tablet   Yes No   Sig: Take 50 mg by mouth daily   ofloxacin (OCUFLOX) 0.3 % ophthalmic solution   Yes No   Sig: Administer 1 drop to both eyes 4 (four) times a day   pantoprazole (PROTONIX) 20 mg tablet   Yes No   Sig: Take 20 mg by mouth daily   prednisoLONE acetate (PRED FORTE) 1 % ophthalmic suspension   Yes No   Sig: Administer 1 drop to both eyes 4 (four) times a day   rosuvastatin (CRESTOR) 5 mg tablet   Yes No   Sig: Take 5 mg by mouth daily      Facility-Administered Medications: None     Patient's Medications   Discharge Prescriptions    No medications on file     No discharge procedures on file.  ED SEPSIS DOCUMENTATION   Time reflects when diagnosis was documented in both MDM as applicable and the Disposition within this note       Time User Action Codes Description Comment    11/26/2024 11:47 AM Ree Ventura [R42] Dizzy     11/26/2024  1:59 PM Yelena Antonio [Z87.898] History of seizures     11/26/2024  1:59 PM Yelena Antonio [Z86.79] History of subdural hematoma                  Ree Ventura MD  11/26/24 2042

## 2024-11-26 NOTE — ASSESSMENT & PLAN NOTE
Chronic hypertension, on metoprolol succinate and lasix; presenting BP range 199-202/79-85  Will monitor and allow for permissive hypertension while acute CVA ruled out  Monitor VS per stroke protocol

## 2024-11-26 NOTE — H&P
H&P - Hospitalist   Name: Pebbles Carreon 84 y.o. female I MRN: [id number]  Unit/Bed#: ED 11 I Date of Admission: 11/26/2024   Date of Service: 11/26/2024 I Hospital Day: 0     Assessment & Plan  Chronic heart failure with preserved ejection fraction (HCC)  Chronic HFpEF, euvolemic appearing   Home regimen lasix 20 mg twice daily, continue  Daily standing weights  Monitor I/Os, serial BMP (K 5.1 on admit, hold KCl x24h)  Consider dietary restrictions   Last echo 3/2/24: LVEF 55-60%, grade II diastolic dysfunction   Dizziness  Presents with dizziness that started earlier today while at the hair salon, patient tilting head back to place eye drops and experienced sensation of the room spinning.  At present time symptoms resolved. No associated headache or visual changes. Denies recent URI.    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   CTA head/neck (11/26): no occlusion cervical or intracranial vasculature; mild stenosis left vertebral artery origin  CXR (11/26): bibasilar subsegmental atelectasis (right worse than left); no focal consolidation     NIHSS at presentation 1 (left facial droop), no TNK given  Stroke pathway initiated,  Neuro consulted  PT, OT, SLP consults  Fall precautions  Neurochecks per protocol  Telemetry monitor   Continue aspirin 81 mg daily, sub pravastatin for rosuvastatin   Check A1c, lipid panel  Stat CTH for any acute neuro changes  No DVT prophylaxis due to h/o SDH   MRI, echo per neuro  Permissive hypertension, goal SBP >200 for first 24hr  History of seizures  Noted, no seizure-like activity; not on AEDs for maintenance   Hypothyroid  Chronic, will continue levothyroxine 125 mcg daily; check TSH level  COPD (chronic obstructive pulmonary disease) (HCC)  Not in acute exacerbation, does not appear to be maintained on any maintenance medications  Will monitor respiratory status   Hypertensive urgency  Chronic  "hypertension, on metoprolol succinate and lasix; presenting BP range 199-202/79-85  Will monitor and allow for permissive hypertension while acute CVA ruled out  Monitor VS per stroke protocol       VTE Pharmacologic Prophylaxis: VTE Score: 4 Moderate Risk (Score 3-4) - Pharmacological DVT Prophylaxis Contraindicated. Sequential Compression Devices Ordered.  Code Status: Level 3 - DNAR and DNI   Discussion with family: Updated  (son) via phone.    Anticipated Length of Stay: Patient will be admitted on an observation basis with an anticipated length of stay of less than 2 midnights secondary to CVA rule out .    History of Present Illness   Chief Complaint: Dizziness (Dizziness starting this AM at 0800 after putting head back to put eyedrops in. PT states \"it felt like the room was spinning.\" Symptoms resolved. PT then went to  and when PT put head back for washing, began feeling dizziness again. C/o of front right headache, denies current dizziness. PT states \"a week ago I was having R ear pain that radiated down R side of neck but those symptoms have resolved.\"  Currently alert and oriented x4. L facial defecit from previous stroke. B/l perforated ear d)     Pebbles Carreon is a 84 y.o. female with a PMH of stroke, dry eyes, subdural hematoma s/p craniotomy, CAD s/p CABG who presents with acute onset of dizziness. Per report, patient did have some ENT issues recently with some right ear issues but that those are have resolved. No tinnitus or hearing loss associated. Has been on eye drops for post-cataract maintenance since mid-November, never had this happen before. At time of my evaluation, all symptoms are resolved.     Review of Systems   Constitutional:  Negative for activity change, fatigue and fever.   HENT:  Positive for ear pain (R, about 10-14 days ago, resolved). Negative for hearing loss, sinus pressure and tinnitus.    Eyes:  Negative for photophobia and visual " disturbance.   Respiratory:  Negative for cough, shortness of breath and wheezing.    Cardiovascular:  Negative for chest pain, palpitations and leg swelling.   Gastrointestinal:  Negative for abdominal distention and constipation.   Genitourinary: Negative.    Musculoskeletal: Negative.    Skin: Negative.    Allergic/Immunologic: Negative for immunocompromised state.   Neurological:  Positive for dizziness and facial asymmetry (chronic, L). Negative for seizures, syncope, weakness, light-headedness and headaches.   Psychiatric/Behavioral:  Negative for confusion.    All other systems reviewed and are negative.      Historical Information   Past Medical History:   Diagnosis Date   • Stroke (HCC)    • Vomiting 03/21/2024     Past Surgical History:   Procedure Laterality Date   • CORONARY ARTERY BYPASS GRAFT  2012    X 4   • CRANIOTOMY  04/10/2022    Right-sided frontal and parietal bur holes with evacuation of subdural hematoma   • TOTAL SHOULDER ARTHROPLASTY Right 03/24/2022     Social History     Tobacco Use   • Smoking status: Never   • Smokeless tobacco: Never   Vaping Use   • Vaping status: Never Used   Substance and Sexual Activity   • Alcohol use: Never   • Drug use: Never   • Sexual activity: Not on file     E-Cigarette/Vaping   • E-Cigarette Use Never User      E-Cigarette/Vaping Substances     Family History   Problem Relation Age of Onset   • Leukemia Mother    • Aneurysm Father         Brain   • Thyroid disease Sister    • Heart disease Sister    • Thyroid disease Brother    • Prostate cancer Brother      Social History:  Marital Status: Single   Occupation: na  Patient Pre-hospital Living Situation: Home  Patient Pre-hospital Level of Mobility: walks with walker  Patient Pre-hospital Diet Restrictions:     Meds/Allergies   I have reviewed home medications with patient personally.  Prior to Admission medications    Medication Sig Start Date End Date Taking? Authorizing Provider   ascorbic acid (VITAMIN C)  500 mg tablet Take 500 mg by mouth daily    Historical Provider, MD   aspirin 81 mg chewable tablet Chew 81 mg daily Do not start before March 30, 2024. 3/30/24   Historical Provider, MD   Cholecalciferol 50 MCG (2000 UT) TABS Take 1 tablet by mouth in the morning    Historical Provider, MD   ferrous sulfate 325 (65 Fe) mg tablet Take 325 mg by mouth daily with breakfast    Historical Provider, MD   furosemide (LASIX) 40 mg tablet Take 40 mg by mouth 2 (two) times a day 4/11/24   Historical Provider, MD   levothyroxine 125 mcg tablet Take 125 mcg by mouth daily    Historical Provider, MD   magnesium chloride (MAG64) 64 MG TBEC EC tablet Take 64 mg by mouth daily    Historical Provider, MD   Melatonin 5 MG TABS Take 5 mg by mouth daily at bedtime    Historical Provider, MD   metoprolol succinate (TOPROL-XL) 50 mg 24 hr tablet Take 50 mg by mouth daily    Historical Provider, MD   pantoprazole (PROTONIX) 20 mg tablet Take 20 mg by mouth daily    Historical Provider, MD   Potassium Chloride ER 20 MEQ TBCR Take 20 mEq by mouth 2 (two) times a day    Historical Provider, MD   Propylene Glycol, PF, (Systane Complete PF) 0.6 % SOLN Apply 1 drop to eye every 8 (eight) hours as needed (Both eyes)    Historical Provider, MD   rosuvastatin (CRESTOR) 5 mg tablet Take 5 mg by mouth daily    Historical Provider, MD     Allergies   Allergen Reactions   • Oxycodone Delirium   • Peach [Prunus Persica] Hives   • Hydrocodone-Acetaminophen Anxiety   • Morphine Anxiety       Objective :  Temp:  [98 °F (36.7 °C)] 98 °F (36.7 °C)  HR:  [47-56] 48  BP: (164-215)/(72-85) 164/72  Resp:  [16-22] 22  SpO2:  [97 %-100 %] 97 %  O2 Device: None (Room air)    Physical Exam  Vitals and nursing note reviewed.   Constitutional:       General: She is awake. She is not in acute distress.     Appearance: Normal appearance. She is well-developed and well-groomed. She is obese. She is not ill-appearing or toxic-appearing.   HENT:      Head: Normocephalic  and atraumatic.      Nose: Nose normal.      Mouth/Throat:      Mouth: Mucous membranes are moist.   Eyes:      General: No scleral icterus.     Extraocular Movements: Extraocular movements intact.      Pupils: Pupils are equal, round, and reactive to light.   Cardiovascular:      Rate and Rhythm: Regular rhythm. Bradycardia present.      Heart sounds: Normal heart sounds.   Pulmonary:      Effort: Pulmonary effort is normal. No respiratory distress.      Breath sounds: No wheezing.   Abdominal:      General: There is no distension.      Palpations: Abdomen is soft.      Tenderness: There is no abdominal tenderness. There is no guarding.   Musculoskeletal:      Right lower leg: No edema.      Left lower leg: No edema.   Skin:     General: Skin is warm and dry.      Coloration: Skin is not jaundiced or pale.   Neurological:      Mental Status: She is alert and oriented to person, place, and time. Mental status is at baseline.      Cranial Nerves: Cranial nerve deficit (left facial droop, baseline) present.      Sensory: No sensory deficit.      Gait: Gait abnormal (secondary to knee pain).   Psychiatric:         Mood and Affect: Mood normal.         Behavior: Behavior normal. Behavior is cooperative.         Thought Content: Thought content normal.         Judgment: Judgment normal.         Lines/Drains:            Lab Results: I have reviewed the following results:  Results from last 7 days   Lab Units 11/26/24  1153   WBC Thousand/uL 8.00   HEMOGLOBIN g/dL 12.9   HEMATOCRIT % 39.5   PLATELETS Thousands/uL 174     Results from last 7 days   Lab Units 11/26/24  1153   SODIUM mmol/L 138   POTASSIUM mmol/L 5.1   CHLORIDE mmol/L 105   CO2 mmol/L 27   BUN mg/dL 14   CREATININE mg/dL 0.81   ANION GAP mmol/L 6   CALCIUM mg/dL 9.7   GLUCOSE RANDOM mg/dL 88     Results from last 7 days   Lab Units 11/26/24  1330   INR  0.89     Results from last 7 days   Lab Units 11/26/24  1129   POC GLUCOSE mg/dl 97     Lab Results    Component Value Date    HGBA1C 5.8 (H) 08/04/2023    HGBA1C 5.5 01/17/2023    HGBA1C 6.0 (H) 03/31/2022           Imaging Results Review: I reviewed radiology reports from this admission including: chest xray, CT head, and CT A head/neck.  Other Study Results Review: EKG was personally reviewed and my interpretation is: Sinus Bradycardia. HR 52, 1st degree AV block noted ..    Administrative Statements   Topics discussed with the patient / family include symptom assessment and management.    ** Please Note: This note has been constructed using a voice recognition system. **

## 2024-11-26 NOTE — ASSESSMENT & PLAN NOTE
Not in acute exacerbation, does not appear to be maintained on any maintenance medications  Will monitor respiratory status

## 2024-11-26 NOTE — ASSESSMENT & PLAN NOTE
Presents with dizziness that started earlier today while at the hair salon, patient tilting head back to place eye drops and experienced sensation of the room spinning.  At present time symptoms resolved. No associated headache or visual changes. Denies recent URI.    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   CTA head/neck (11/26): no occlusion cervical or intracranial vasculature; mild stenosis left vertebral artery origin  CXR (11/26): bibasilar subsegmental atelectasis (right worse than left); no focal consolidation     NIHSS at presentation 1 (left facial droop), no TNK given  Stroke pathway initiated,  Neuro consulted  PT, OT, SLP consults  Fall precautions  Neurochecks per protocol  Telemetry monitor   Continue aspirin 81 mg daily, sub pravastatin for rosuvastatin   Check A1c, lipid panel  Stat CTH for any acute neuro changes  No DVT prophylaxis due to h/o SDH   MRI, echo per neuro  Permissive hypertension, goal SBP >200 for first 24hr

## 2024-11-26 NOTE — CONSULTS
"Consultation - Neurology   Name: Pebbles Carreon 84 y.o. female I MRN: [id number]  Unit/Bed#: ED 11 I Date of Admission: 11/26/2024   Date of Service: 11/26/2024 I Hospital Day: 0   Consult to Neurology  Consult performed by: Aline Steward PA-C  Consult ordered by: Ree Ventura MD      Inpatient consult to Neurology  Consult performed by: Aline Steward PA-C  Consult ordered by: Yelena Antonio PA-C        Physician Requesting Evaluation: Ree Ventura MD   Reason for Evaluation / Principal Problem: Dizziness     Assessment & Plan  Dizziness   84 y.o. right handed female with CHF, HTN, CAD, previous R SDH, seizure (successfully tapered off of Keppra) who presents to [facility] with dizziness. A stroke alert was initiated by the ED. BP on arrival 176/78. NIHSS 1 (L facial droop). CTA H/N wwo contrast without IR target. Pt not a thrombolytic candidate due to low NIHSS/non-disabling symptoms. Pt on ASA 81 mg daily PTA.    Workup  - CTH wo contrast 11/26/24:  \"No acute intracranial abnormality. 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 Mild stenosis of the left vertebral artery origin.\"    Plan  - Stroke pathway recommended as follows:   MRI brain wo contrast   Echo   Labs  Lipid Panel  Hemoglobin A1c  Aspirin 81 mg daily   Atorvastatin 40 mg  Permissive HTN, allow for SBP up to 200 x 24 hours from onset of stroke like symptoms   Euglycemic, normothermic goal  Continue telemetry  PT/OT/ST  Stroke education  Frequent neuro checks. Continue to monitor and notify neurology with any changes.  STAT CT head for any acute change in neuro exam  - Medical management and supportive care per primary team. Correction of any metabolic or infectious disturbances.             Thrombolytic Decision: Patient not a candidate. Symptoms " "resolved/clearly non disabling.    Recommendations for outpatient neurological follow up have yet to be determined.    History of Present Illness   Hx and PE limited by: Acuity of situation   Patient last known well: 1030 AM   Stroke alert called: 1155 AM   Neurology time of arrival: 1155 AM   HPI: Pebbles Carreon is an 84 y.o. right handed female with CHF, HTN, CAD, previous R SDH, seizure (successfully tapered off of Keppra) who presents to [facility] with dizziness. A stroke alert was initiated by the ED. BP on arrival 176/78. NIHSS 1 (L facial droop). CTA H/N wwo contrast without IR target. Pt not a thrombolytic candidate due to low NIHSS/non-disabling symptoms. Pt on ASA 81 mg daily PTA.     Patient reports she woke up this morning and felt at her baseline.  Patient reports when she tilted her head back to do her eyedrops s/p cataract surgery, she felt a little dizzy.  Patient went to the hairdresser with her aide around 10:30 AM and felt at her baseline.  Patient's hairdresser called patient's son around 10:40 AM stating that patient felt very dizzy when she tilted her head back.  When patient's son got on the phone with the patient, patient stated \"I think I am having a stroke.\"  Patient send instructed hairdresser to call 911 and sent patient to the hospital.  Patient's son arrived to the hairdresser around the same time as EMS and patient's son states patient's face looked at her baseline.  Per patient's son, patient's left side of her face is a little bit droopy at baseline without her partial dentures in.    Patient reports she feels dizzy when she moves around, unsure of which direction.  Upon further testing, it appears patient feels dizzy when looking towards the left.    Patient reports she takes aspirin 81 mg daily at home.  Patient reports no missed doses of aspirin.  Patient does not drive.    Previous Neurologic History  Pt previously followed with [facility], last evaluated by Char Angulo, " CRNP on 9/25/23. Pt was being followed for R SDH and seizure. Pt was tapered off of Keppra with no interval seizures. Pt had a R SDH in 2022 after a fall downstairs. Pt ultimately had an MMA embolization on 4/8/22. Pt noted to have L facial droop and L sided weakness. Pt had focal seizures manifesting as L face and arm twitching along with L gaze. Pt then underwent R sided frontal and parietal dedrick holes with evacuation of subdural hematoma and placement of subdural drain on 4/10/22. Pt was maintained on Keppra 750 mg BID after hospitalization.     Review of Systems  I have reviewed the patient's PMH, PSH, Social History, Family History, Meds, and Allergies  Historical Information   Past Medical History:   Diagnosis Date    Stroke (HCC)     Vomiting 03/21/2024     Past Surgical History:   Procedure Laterality Date    CORONARY ARTERY BYPASS GRAFT  2012    X 4    CRANIOTOMY  04/10/2022    Right-sided frontal and parietal bur holes with evacuation of subdural hematoma    TOTAL SHOULDER ARTHROPLASTY Right 03/24/2022     Social History     Tobacco Use    Smoking status: Never    Smokeless tobacco: Never   Vaping Use    Vaping status: Never Used   Substance and Sexual Activity    Alcohol use: Never    Drug use: Never    Sexual activity: Not on file     E-Cigarette/Vaping    E-Cigarette Use Never User      E-Cigarette/Vaping Substances     Family History   Problem Relation Age of Onset    Leukemia Mother     Aneurysm Father         Brain    Thyroid disease Sister     Heart disease Sister     Thyroid disease Brother     Prostate cancer Brother      Social History     Tobacco Use    Smoking status: Never    Smokeless tobacco: Never   Vaping Use    Vaping status: Never Used   Substance and Sexual Activity    Alcohol use: Never    Drug use: Never    Sexual activity: Not on file     No current facility-administered medications for this encounter.  Prior to Admission Medications   Prescriptions Last Dose Informant Patient  Reported? Taking?   Cholecalciferol 50 MCG (2000 UT) TABS   Yes No   Sig: Take 1 tablet by mouth in the morning   Melatonin 5 MG TABS   Yes No   Sig: Take 5 mg by mouth daily at bedtime   Potassium Chloride ER 20 MEQ TBCR   Yes No   Sig: Take 20 mEq by mouth 2 (two) times a day   Propylene Glycol, PF, (Systane Complete PF) 0.6 % SOLN   Yes No   Sig: Apply 1 drop to eye every 8 (eight) hours as needed (Both eyes)   ascorbic acid (VITAMIN C) 500 mg tablet   Yes No   Sig: Take 500 mg by mouth daily   aspirin 81 mg chewable tablet   Yes No   Sig: Chew 81 mg daily Do not start before March 30, 2024.   ferrous sulfate 325 (65 Fe) mg tablet   Yes No   Sig: Take 325 mg by mouth daily with breakfast   furosemide (LASIX) 40 mg tablet   Yes No   Sig: Take 40 mg by mouth 2 (two) times a day   levothyroxine 125 mcg tablet   Yes No   Sig: Take 125 mcg by mouth daily   magnesium chloride (MAG64) 64 MG TBEC EC tablet   Yes No   Sig: Take 64 mg by mouth daily   metoprolol succinate (TOPROL-XL) 50 mg 24 hr tablet   Yes No   Sig: Take 50 mg by mouth daily   pantoprazole (PROTONIX) 20 mg tablet   Yes No   Sig: Take 20 mg by mouth daily   rosuvastatin (CRESTOR) 5 mg tablet   Yes No   Sig: Take 5 mg by mouth daily      Facility-Administered Medications: None     Oxycodone, Peach [prunus persica], Hydrocodone-acetaminophen, and Morphine    Objective :  Temp:  [98 °F (36.7 °C)] 98 °F (36.7 °C)  HR:  [52] 52  BP: (176)/(78) 176/78  Resp:  [18] 18    Physical Exam  Vitals and nursing note reviewed.   HENT:      Head: Normocephalic and atraumatic.      Mouth/Throat:      Mouth: Mucous membranes are moist.   Eyes:      Extraocular Movements: Extraocular movements intact. No nystagmus.   Cardiovascular:      Rate and Rhythm: Bradycardia present.   Neurological:      Mental Status: She is alert.      Cranial Nerves: No dysarthria.     Neurological Exam  Mental Status  Alert. Oriented to person, place and time. no dysarthria present. Able to name  objects, repeat and read. Follows one-step commands. Attention and concentration: Appropriately attends to povider .    Cranial Nerves  CN II: Right visual acuity: Counts fingers. Left visual acuity: Counts fingers. Right normal visual field. Left normal visual field.  CN III, IV, VI: Extraocular movements intact bilaterally. No nystagmus.  CN V: Facial sensation is normal.  CN VII:  Left: There is peripheral facial weakness. Corrects with smile .  CN XII: Tongue midline without atrophy or fasciculations.    - hearing grossly intact .    Motor      -Able to lift bilateral upper extremities antigravity without drift x 10 seconds  - Able to lift bilateral lower extremity antigravity x 5 seconds without drift.    Sensory  Light touch is normal in upper and lower extremities.  Right extinction absent: Left extinction absent:    Coordination  Right: Finger-to-nose normal.Left: Finger-to-nose normal.  - Patient unable to perform heel-to-shin testing due to pain in bilateral knees.      NIHSS:  1a.Level of Consciousness: 0 = Alert   1b. LOC Questions: 0 = Answers both correctly   1c. LOC Commands: 0 = Obeys both correctly   2. Best Gaze: 0 = Normal   3. Visual: 0 = No visual field loss   4. Facial Palsy: 1=Minor paralysis (flattened nasolabial fold, asymmetric on smiling)   5a. Motor Right Arm: 0=No drift, limb holds 90 (or 45) degrees for full 10 seconds   5b. Motor Left Arm: 0=No drift, limb holds 90 (or 45) degrees for full 10 seconds   6a. Motor Right Le=No drift, limb holds 90 (or 45) degrees for full 10 seconds   6b. Motor Left Le=No drift, limb holds 90 (or 45) degrees for full 10 seconds   7. Limb Ataxia:  0=Absent   8. Sensory: 0=Normal; no sensory loss   9. Best Language:  0=No aphasia, normal   10. Dysarthria: 0=Normal articulation   11. Extinction and Inattention (formerly Neglect): 0=No abnormality   Total Score: 1     Time NIHSS was completed: 1200 PM     Modified Bill Score:  2 (Unable to carry out  "all previous activities, but able to look after own affairs without assistance)      Lab Results: I have reviewed the following results:CBC/BMP:   .     11/26/24  1153   WBC 8.00   HGB 12.9   HCT 39.5      SODIUM 138   K 5.1      CO2 27   BUN 14   CREATININE 0.81   GLUC 88    , Creatinine Clearance: CrCl cannot be calculated (Unknown ideal weight.)., LFTs: No new results in last 24 hours. , PTT/INR:No new results in last 24 hours. , Troponin,BNP:  .     11/26/24  1153   HSTNI0 4    , Lactic Acid: No new results in last 24 hours. , Procalcitonin: No results found for: \"PROCALCITONI\", ABG: No new results in last 24 hours. , Lipase: No results found for: \"LIPASE\", Amylase: No results found for: \"AMYLASE\", Ammonia:   , Vitamins B1, B6, B12, A, and D: No results found for: \"B1\", \"THYROGLB\", \"[id number]\", \"[id number]\", Iron: No results found for: \"IRON\", Transferrin: No results found for: \"TRANSFERRIN\", Folate: No results found for: \"FOLATE\", Prealbumin: No results found for: \"PREALBUMIN\", Lipid Profile:   , TSH:   , Blood Culture: No results found for: \"BLOODCX\", Urinalysis: No results found for: \"COLORU\", \"CLARITYU\", \"SPECGRAV\", \"PHUR\", \"LEUKOCYTESUR\", \"NITRITE\", \"PROTEINUA\", \"GLUCOSEU\", \"KETONESU\", \"BILIRUBINUR\", \"BLOODU\", Urine Culture: No results found for: \"URINECX\", Wound Culure: No results found for: \"WOUNDCULT\", Lipid Profile:   , Drug Screen:   , Medication Drug Levels:       Invalid input(s): \"CARBAMAZEPINE\", \"OXCARBAZEPINE\", Sputum Culture: No results found for: \"SPUTUMCULTUR\", Throat Culture: No components found for: \"THROATCX\", RSV: No results found for: \"RSV\", FLU: No components found for: \"INFLUENZA\", Rapid Strep: No components found for: \"RAPIDSTREP\"    Imaging Results Review: I personally reviewed the following image studies in PACS and associated radiology reports: CTA H/N wwo contrast and CT head. My interpretation of the radiology images/reports is: no LVO, no ICH.          Code Status: " No Order  Advance Directive and Living Will:      Power of :    POLST:      Critical care time spent by this provider: 45 minutes

## 2024-11-26 NOTE — ASSESSMENT & PLAN NOTE
" 84 y.o. right handed female with CHF, HTN, CAD, previous R SDH, seizure (successfully tapered off of Keppra) who presents to [facility] with dizziness. A stroke alert was initiated by the ED. BP on arrival 176/78. NIHSS 1 (L facial droop). CTA H/N wwo contrast without IR target. Pt not a thrombolytic candidate due to low NIHSS/non-disabling symptoms. Pt on ASA 81 mg daily PTA.    Workup  - CTH wo contrast 11/26/24:  \"No acute intracranial abnormality. 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 Mild stenosis of the left vertebral artery origin.\"    Plan  - Stroke pathway recommended as follows:   MRI brain wo contrast   Echo   Labs  Lipid Panel  Hemoglobin A1c  Aspirin 81 mg daily   Atorvastatin 40 mg  Permissive HTN, allow for SBP up to 200 x 24 hours from onset of stroke like symptoms   Euglycemic, normothermic goal  Continue telemetry  PT/OT/ST  Stroke education  Frequent neuro checks. Continue to monitor and notify neurology with any changes.  STAT CT head for any acute change in neuro exam  - Medical management and supportive care per primary team. Correction of any metabolic or infectious disturbances.         "

## 2024-11-26 NOTE — ASSESSMENT & PLAN NOTE
Chronic HFpEF, euvolemic appearing   Home regimen lasix 20 mg twice daily, continue  Daily standing weights  Monitor I/Os, serial BMP (K 5.1 on admit, hold KCl x24h)  Consider dietary restrictions   Last echo 3/2/24: LVEF 55-60%, grade II diastolic dysfunction

## 2024-11-26 NOTE — ED NOTES
Pt ambulated to the restroom at this time     Laine Mayorga RN  11/26/24 8268     decreased strength

## 2024-11-27 ENCOUNTER — APPOINTMENT (INPATIENT)
Dept: MRI IMAGING | Facility: HOSPITAL | Age: 84
DRG: 305 | End: 2024-11-27
Payer: MEDICARE

## 2024-11-27 ENCOUNTER — APPOINTMENT (OUTPATIENT)
Dept: NON INVASIVE DIAGNOSTICS | Facility: HOSPITAL | Age: 84
DRG: 305 | End: 2024-11-27
Payer: MEDICARE

## 2024-11-27 VITALS
HEIGHT: 60 IN | OXYGEN SATURATION: 94 % | SYSTOLIC BLOOD PRESSURE: 133 MMHG | RESPIRATION RATE: 18 BRPM | WEIGHT: 183 LBS | TEMPERATURE: 98.3 F | BODY MASS INDEX: 35.93 KG/M2 | HEART RATE: 62 BPM | DIASTOLIC BLOOD PRESSURE: 66 MMHG

## 2024-11-27 LAB
AORTIC ROOT: 3.1 CM
APICAL FOUR CHAMBER EJECTION FRACTION: 58 %
ASCENDING AORTA: 3.1 CM
BSA FOR ECHO PROCEDURE: 1.8 M2
CHOLEST SERPL-MCNC: 109 MG/DL (ref ?–200)
E WAVE DECELERATION TIME: 278 MS
E/A RATIO: 1.25
FRACTIONAL SHORTENING: 33 (ref 28–44)
HDLC SERPL-MCNC: 32 MG/DL
INTERVENTRICULAR SEPTUM IN DIASTOLE (PARASTERNAL SHORT AXIS VIEW): 0.8 CM
INTERVENTRICULAR SEPTUM: 0.8 CM (ref 0.6–1.1)
LA/AORTA RATIO 2D: 1.32
LAAS-AP2: 19.1 CM2
LAAS-AP4: 17 CM2
LDLC SERPL CALC-MCNC: 40 MG/DL (ref 0–100)
LEFT ATRIUM SIZE: 4.1 CM
LEFT ATRIUM VOLUME (MOD BIPLANE): 54 ML
LEFT ATRIUM VOLUME INDEX (MOD BIPLANE): 30 ML/M2
LEFT INTERNAL DIMENSION IN SYSTOLE: 3.3 CM (ref 2.1–4)
LEFT VENTRICULAR INTERNAL DIMENSION IN DIASTOLE: 4.9 CM (ref 3.5–6)
LEFT VENTRICULAR POSTERIOR WALL IN END DIASTOLE: 0.9 CM
LEFT VENTRICULAR STROKE VOLUME: 67 ML
LVSV (TEICH): 67 ML
MV E'TISSUE VEL-SEP: 10 CM/S
MV PEAK A VEL: 1.07 M/S
MV PEAK E VEL: 134 CM/S
MV STENOSIS PRESSURE HALF TIME: 81 MS
MV VALVE AREA P 1/2 METHOD: 2.72
RIGHT VENTRICLE ID DIMENSION: 3.1 CM
SL CV LV EF: 60
SL CV PED ECHO LEFT VENTRICLE DIASTOLIC VOLUME (MOD BIPLANE) 2D: 111 ML
SL CV PED ECHO LEFT VENTRICLE SYSTOLIC VOLUME (MOD BIPLANE) 2D: 44 ML
TRICUSPID ANNULAR PLANE SYSTOLIC EXCURSION: 2.3 CM
TRIGL SERPL-MCNC: 186 MG/DL (ref ?–150)

## 2024-11-27 PROCEDURE — 70551 MRI BRAIN STEM W/O DYE: CPT

## 2024-11-27 PROCEDURE — 99232 SBSQ HOSP IP/OBS MODERATE 35: CPT | Performed by: PHYSICIAN ASSISTANT

## 2024-11-27 PROCEDURE — 80061 LIPID PANEL: CPT | Performed by: PHYSICIAN ASSISTANT

## 2024-11-27 PROCEDURE — 93306 TTE W/DOPPLER COMPLETE: CPT | Performed by: STUDENT IN AN ORGANIZED HEALTH CARE EDUCATION/TRAINING PROGRAM

## 2024-11-27 PROCEDURE — 92610 EVALUATE SWALLOWING FUNCTION: CPT

## 2024-11-27 PROCEDURE — 93306 TTE W/DOPPLER COMPLETE: CPT

## 2024-11-27 RX ORDER — KETOROLAC TROMETHAMINE 5 MG/ML
1 SOLUTION OPHTHALMIC DAILY
Status: DISCONTINUED | OUTPATIENT
Start: 2024-11-27 | End: 2024-11-27 | Stop reason: HOSPADM

## 2024-11-27 RX ORDER — LEVOTHYROXINE SODIUM 112 UG/1
112 TABLET ORAL
Status: DISCONTINUED | OUTPATIENT
Start: 2024-11-28 | End: 2024-11-27 | Stop reason: HOSPADM

## 2024-11-27 RX ORDER — PREDNISOLONE ACETATE 10 MG/ML
1 SUSPENSION/ DROPS OPHTHALMIC 2 TIMES DAILY
Status: DISCONTINUED | OUTPATIENT
Start: 2024-11-27 | End: 2024-11-27 | Stop reason: HOSPADM

## 2024-11-27 RX ADMIN — FUROSEMIDE 20 MG: 20 TABLET ORAL at 08:51

## 2024-11-27 RX ADMIN — MAGNESIUM 64 MG (MAGNESIUM CHLORIDE) TABLET,DELAYED RELEASE 64 MG: at 08:52

## 2024-11-27 RX ADMIN — ASPIRIN 81 MG: 81 TABLET, CHEWABLE ORAL at 08:51

## 2024-11-27 RX ADMIN — PRAVASTATIN SODIUM 40 MG: 40 TABLET ORAL at 15:30

## 2024-11-27 RX ADMIN — FUROSEMIDE 20 MG: 20 TABLET ORAL at 15:30

## 2024-11-27 RX ADMIN — LEVOTHYROXINE SODIUM 125 MCG: 125 TABLET ORAL at 05:56

## 2024-11-27 RX ADMIN — PANTOPRAZOLE SODIUM 20 MG: 20 TABLET, DELAYED RELEASE ORAL at 08:51

## 2024-11-27 RX ADMIN — KETOROLAC TROMETHAMINE 1 DROP: 5 SOLUTION OPHTHALMIC at 18:04

## 2024-11-27 RX ADMIN — PREDNISOLONE ACETATE 1 DROP: 10 SUSPENSION/ DROPS OPHTHALMIC at 18:05

## 2024-11-27 NOTE — PHYSICAL THERAPY NOTE
Physical Therapy Screen    Patient's Name: Pebbles Carreon    Admitting Diagnosis  Dizziness [R42]  Dizzy [R42]  History of seizures [Z87.898]  History of subdural hematoma [Z86.79]    Problem List  Patient Active Problem List   Diagnosis    Chronic heart failure with preserved ejection fraction (HCC)    Coronary artery disease    History of seizures    History of vertebral fracture    Vitamin D deficiency    Gastroesophageal reflux disease without esophagitis    IFG (impaired fasting glucose)    Hyperlipidemia    Hypothyroid    COPD (chronic obstructive pulmonary disease) (HCC)    History of subdural hematoma    Acute blood loss as cause of postoperative anemia    Closed nondisplaced intertrochanteric fracture of left femur (HCC)    Hypertensive urgency    Insomnia    DNR (do not resuscitate)    Diuretic-induced hypokalemia    Diastolic dysfunction    Elevated LFTs    Dizziness     Past Medical History  Past Medical History:   Diagnosis Date    Stroke (HCC)     Vomiting 03/21/2024     Past Surgical History  Past Surgical History:   Procedure Laterality Date    CORONARY ARTERY BYPASS GRAFT  2012    X 4    CRANIOTOMY  04/10/2022    Right-sided frontal and parietal bur holes with evacuation of subdural hematoma    TOTAL SHOULDER ARTHROPLASTY Right 03/24/2022 11/27/24 1058   PT Last Visit   PT Visit Date 11/27/24   Note Type   Note type Screen     Chart reviewed. PT screen performed. Pt was received seated in recliner in NAD. Pt reports that she has been independently ambulating, with use of RW, in the room. Pt reports that she uses a RW at home. Pt denies dizziness or weakness. Pt with no concerns in regards to her functional mobility. Pt with an AMPAC score of 22. Pt with no acute PT impairments at this time. Pt functioning at her baseline status. Plan to discontinue PT at this time.    Amie Curry, PT, DPT

## 2024-11-27 NOTE — CASE MANAGEMENT
Case Management Assessment & Discharge Planning Note    Patient name Pebbles Carreon  Location 2 [facility] 259/2 E 259-01 MRN [id number]  : 1940 Date 2024       Current Admission Date: 2024  Current Admission Diagnosis:Dizziness   Patient Active Problem List    Diagnosis Date Noted Date Diagnosed    Dizziness 2024     Elevated LFTs 2024     Chronic heart failure with preserved ejection fraction (HCC) 2024     Coronary artery disease 2024     History of seizures 2024     History of vertebral fracture 2024     Vitamin D deficiency 2024     Gastroesophageal reflux disease without esophagitis 2024     IFG (impaired fasting glucose) 2024     Hyperlipidemia 2024     Hypothyroid 2024     COPD (chronic obstructive pulmonary disease) (HCC) 2024     History of subdural hematoma 2024     Acute blood loss as cause of postoperative anemia 2024     Closed nondisplaced intertrochanteric fracture of left femur (HCC) 2024     Hypertensive urgency 2024     Insomnia 2024     Diuretic-induced hypokalemia 2024     Diastolic dysfunction 2024     DNR (do not resuscitate) 2024       LOS (days): 0  Geometric Mean LOS (GMLOS) (days):   Days to GMLOS:     OBJECTIVE:              Current admission status: Observation       Preferred Pharmacy:   [id number] - [place] [address] [address]  Phone: [phone] Fax: [phone]    Primary Care Provider: Balwinder Jon DO    Primary Insurance: [employer]  REP  Secondary Insurance: [facility]    ASSESSMENT:  Active Health Care Proxies       Kaila Gross [facility] Care Agent - Daughter   Primary Phone: [phone] (Mobile)                 Advance Directives  Primary Contact: Moreno (Son)  [phone]         Readmission Root Cause  30 Day  Readmission: No    Patient Information  Admitted from:: Home  Mental Status: Alert  During Assessment patient was accompanied by: Not accompanied during assessment  Assessment information provided by:: Patient  Primary Caregiver: Self  Support Systems: Self, Son, Private Caregivers  County of Residence: [place]  What city do you live in?: [place]  Home entry access options. Select all that apply.: No steps to enter home  Type of Current Residence: Apartment  Floor Level: 6  Upon entering residence, is there a bedroom on the main floor (no further steps)?: Yes  Upon entering residence, is there a bathroom on the main floor (no further steps)?: Yes  Living Arrangements: Lives Alone  Is patient a ?: No    Activities of Daily Living Prior to Admission  Functional Status: Assistance  Completes ADLs independently?: No  Level of ADL dependence: Assistance  Ambulates independently?: Yes  Does patient use assisted devices?: Yes  Assisted Devices (DME) used: Walker, Shower Chair  Does patient currently own DME?: Yes  What DME does the patient currently own?: Rollator, Shower Chair, Walker  Does patient have a history of Outpatient Therapy (PT/OT)?: No  Does the patient have a history of Short-Term Rehab?: Yes ([facility] 8 weeks in the spring)  Does patient have a history of HHC?: Yes  Does patient currently have HHC?: Yes    Current Home Health Care  Type of Current Home Care Services: Home health aide  Home Health Agency Name:: Other  Current Home Health Follow-Up Provider:: PCP (pt does not know agency name, reports it is paid for by waiver services)    Patient Information Continued  Income Source: SSI/SSD  Does patient have prescription coverage?: Yes  Does patient receive dialysis treatments?: No  Does patient have a history of substance abuse?: No  Does patient have a history of Mental Health Diagnosis?: No         Means of Transportation  Means of Transport to Appts:: Family transport          DISCHARGE  DETAILS:    Discharge planning discussed with:: patient at bedside  Freedom of Choice: Yes  Comments - Freedom of Choice: CM maintained freedom of choice as it pertains to discharge planning. Patient reports plan to d/c to home at d/c, pt reports she would not be interested in going to [facility] or HHC/visiting PT/OT. Patient reports she has HHA 3x/week thru waiver services and they can help her w transport. Patient reprots her son or daughter will transport her home at d/c.  CM contacted family/caregiver?: No- see comments (pt declined/ alert and oriented independent adult)  Were Treatment Team discharge recommendations reviewed with patient/caregiver?: Yes  Did patient/caregiver verbalize understanding of patient care needs?: Yes  Were patient/caregiver advised of the risks associated with not following Treatment Team discharge recommendations?: Yes         Requested Home Health Care         Is the patient interested in HHC at discharge?: No  Home Health Agency Name:: Other    DME Referral Provided  Referral made for DME?: No    Other Referral/Resources/Interventions Provided:  Interventions: Declines resources    Would you like to participate in our [facility] service program?  : No - Declined    Treatment Team Recommendation: Home  Discharge Destination Plan:: Home  Transport at Discharge : Family

## 2024-11-27 NOTE — PROGRESS NOTES
Progress Note - Hospitalist   Name: Pebbles Carreon 84 y.o. female I MRN: [id number]  Unit/Bed#: 2 E 259-01 I Date of Admission: 11/26/2024   Date of Service: 11/27/2024 I Hospital Day: 0    Assessment & Plan  Chronic heart failure with preserved ejection fraction (HCC)  Chronic HFpEF, euvolemic appearing   Home regimen lasix 20 mg twice daily, continue  Daily standing weights  Monitor I/Os, serial BMP (K 5.1 on admit, hold KCl x24h)  Consider dietary restrictions   Last echo 3/2/24: LVEF 55-60%, grade II diastolic dysfunction   Dizziness  Presents with dizziness that started 11/26 while at the hair salon, patient tilting head back to place eye drops and experienced sensation of the room spinning.  At present time symptoms resolved. No associated headache or visual changes. Denies recent URI.    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   CTA head/neck (11/26): no occlusion cervical or intracranial vasculature; mild stenosis left vertebral artery origin  CXR (11/26): bibasilar subsegmental atelectasis (right worse than left); no focal consolidation     NIHSS at presentation 1 (left facial droop), no TNK given  Stroke pathway initiated,  Neuro consulted  PT, OT, SLP consults  Fall precautions  Neurochecks per protocol  Telemetry monitor   Continue aspirin 81 mg daily, sub pravastatin for rosuvastatin   Check A1c, lipid panel  Stat CTH for any acute neuro changes  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  History of seizures  Noted, no seizure-like activity; not on AEDs for maintenance   Hypothyroid  Chronic, will continue levothyroxine 125 mcg daily; check TSH level  COPD (chronic obstructive pulmonary disease) (HCC)  Not in acute exacerbation, does not appear to be maintained on any maintenance medications  Will monitor respiratory status   Hypertensive  urgency  Chronic hypertension, on metoprolol succinate and lasix; presenting BP range 199-202/79-85  Monitor VS per stroke protocol   Blood Pressure: 133/68     VTE Pharmacologic Prophylaxis: VTE Score: 4 Moderate Risk (Score 3-4) - Pharmacological DVT Prophylaxis Contraindicated. Sequential Compression Devices Ordered.    Mobility:   Basic Mobility Inpatient Raw Score: 22  JH-HLM Goal: 7: Walk 25 feet or more  JH-HLM Achieved: 3: Sit at edge of bed  JH-HLM Goal NOT achieved. Continue with multidisciplinary rounding and encourage appropriate mobility to improve upon JH-HLM goals.    Patient Centered Rounds: I performed bedside rounds with nursing staff today.   Discussions with Specialists or Other Care Team Provider: CM, Neuro     Education and Discussions with Family / Patient: Patient declined call to .     Current Length of Stay: 0 day(s)  Current Patient Status: Observation   Certification Statement: The patient, admitted on an observation basis, will now require > 2 midnight hospital stay due to awaiting completion of CVA workup for safe discharge plan  Discharge Plan: Anticipate discharge later today or tomorrow to home.    Code Status: Level 3 - DNAR and DNI    Subjective   Patient seen this am, no further episodes of vertigo or other new neurologic symptoms. Awaiting MRI, has already had echo conducted. Has support at home with family and aides, no need for additional help.    Objective :  Temp:  [98 °F (36.7 °C)-98.5 °F (36.9 °C)] 98.5 °F (36.9 °C)  HR:  [47-76] 58  BP: (123-215)/(42-85) 133/68  Resp:  [12-47] 18  SpO2:  [91 %-100 %] 93 %  O2 Device: None (Room air)    Body mass index is 35.78 kg/m².     Input and Output Summary (last 24 hours):     Intake/Output Summary (Last 24 hours) at 11/27/2024 0823  Last data filed at 11/27/2024 0500  Gross per 24 hour   Intake 240 ml   Output --   Net 240 ml       Physical Exam  Vitals and nursing note reviewed.   Constitutional:       General: She is  awake. She is not in acute distress.     Appearance: Normal appearance. She is well-developed and well-groomed. She is not ill-appearing or toxic-appearing.   Eyes:      Extraocular Movements: Extraocular movements intact.   Cardiovascular:      Rate and Rhythm: Normal rate and regular rhythm.   Pulmonary:      Effort: Pulmonary effort is normal. No respiratory distress.   Skin:     Coloration: Skin is not pale.   Neurological:      Mental Status: She is alert and oriented to person, place, and time.      Cranial Nerves: Cranial nerve deficit (slight left facial droop, baseline) present.   Psychiatric:         Mood and Affect: Mood normal.         Behavior: Behavior normal. Behavior is cooperative.            Lines/Drains:        Telemetry:  Telemetry Orders (From admission, onward)               24 Hour Telemetry Monitoring  Continuous x 24 Hours (Telem)        Question:  Reason for 24 Hour Telemetry  Answer:  TIA/Suspected CVA/ Confirmed CVA                     Telemetry Reviewed: Normal Sinus Rhythm  Indication for Continued Telemetry Use: Acute CVA               Lab Results: I have reviewed the following results:   Results from last 7 days   Lab Units 11/26/24  1153   WBC Thousand/uL 8.00   HEMOGLOBIN g/dL 12.9   HEMATOCRIT % 39.5   PLATELETS Thousands/uL 174     Results from last 7 days   Lab Units 11/26/24  1153   SODIUM mmol/L 138   POTASSIUM mmol/L 5.1   CHLORIDE mmol/L 105   CO2 mmol/L 27   BUN mg/dL 14   CREATININE mg/dL 0.81   ANION GAP mmol/L 6   CALCIUM mg/dL 9.7   GLUCOSE RANDOM mg/dL 88     Results from last 7 days   Lab Units 11/26/24  1330   INR  0.89     Results from last 7 days   Lab Units 11/26/24  1129   POC GLUCOSE mg/dl 97               Recent Cultures (last 7 days):         Imaging Results Review: No pertinent imaging studies reviewed.  Other Study Results Review: No additional pertinent studies reviewed.    Last 24 Hours Medication List:     Current Facility-Administered Medications:      acetaminophen (TYLENOL) tablet 650 mg, Q6H PRN    aluminum-magnesium hydroxide-simethicone (MAALOX) oral suspension 30 mL, Q6H PRN    Artificial Tears Op Soln 2 drop, 4x Daily PRN    aspirin chewable tablet 81 mg, Daily    furosemide (LASIX) tablet 20 mg, BID (diuretic)    levothyroxine tablet 125 mcg, Early Morning    magnesium chloride (MAG64) EC tablet TBEC 64 mg, Daily    melatonin tablet 6 mg, HS    [Held by provider] metoprolol succinate (TOPROL-XL) 24 hr tablet 50 mg, Daily    ondansetron (ZOFRAN) injection 4 mg, Q6H PRN    pantoprazole (PROTONIX) EC tablet 20 mg, Daily    polyethylene glycol (MIRALAX) packet 17 g, Daily PRN    [Held by provider] potassium chloride (Klor-Con M20) CR tablet 20 mEq, BID    pravastatin (PRAVACHOL) tablet 40 mg, Daily With Dinner    Administrative Statements   Today, Patient Was Seen By: Yelena Antonio PA-C      **Please Note: This note may have been constructed using a voice recognition system.**

## 2024-11-27 NOTE — OCCUPATIONAL THERAPY NOTE
Occupational Therapy Screen Note        Patient Name: Pebbles Carreon  Today's Date: 11/27/2024 11/27/24 1158   OT Last Visit   OT Visit Date 11/27/24   Note Type   Note type Screen   Additional Comments Chart reviewed. OT screen performed. Pt was received seated in recliner in NAD. Pt reports that she has been independently ambulating, with use of RW, in the room. Pt reports that she uses a RW at home. Pt denies dizziness or weakness. Pt with no concerns in regards to her functional mobility. Pt with an AMPAC score of 23. Pt with no acute OT needs identified to warrant OT services. D/C OT services.

## 2024-11-27 NOTE — UTILIZATION REVIEW
"Initial Clinical Review    Observation on 11/26 @ 1351 upgraded to Inpatient on 11/27 @ 1500. Pt requiring continued sty d/t continued stroke w/u and safe dc plan.    Admission: Date/Time/Statement:   Admission Orders (From admission, onward)       Ordered        11/27/24 1500  INPATIENT ADMISSION  Once            11/26/24 1351  Place in Observation  Once                          Orders Placed This Encounter   Procedures    INPATIENT ADMISSION     Standing Status:   Standing     Number of Occurrences:   1     Level of Care:   Med Surg [16]     Estimated length of stay:   More than 2 Midnights     Certification:   I certify that inpatient services are medically necessary for this patient for a duration of greater than two midnights. See H&P and MD Progress Notes for additional information about the patient's course of treatment.     ED Arrival Information       Expected   -    Arrival   11/26/2024 11:25    Acuity   Emergent              Means of arrival   Ambulance    Escorted by   GERARDO Murray)    Service   Hospitalist    Admission type   Emergency              Arrival complaint   dizziness             Chief Complaint   Patient presents with    Dizziness     Dizziness starting this AM at 0800 after putting head back to put eyedrops in. PT states \"it felt like the room was spinning.\" Symptoms resolved. PT then went to  and when PT put head back for washing, began feeling dizziness again. C/o of front right headache, denies current dizziness. PT states \"a week ago I was having R ear pain that radiated down R side of neck but those symptoms have resolved.\"  Currently alert and oriented x4. L facial defecit from previous stroke. B/l perforated ear d       Initial Presentation: 84 y.o. female  with a PMH of stroke, dry eyes, subdural hematoma s/p craniotomy, CAD s/p CABG presented to the ED from home via EMS w/ acute onset of dizziness.   Pt reports dizziness started at 8 am today after putting head back to " "put eyedrops in. PT states \"it felt like the room was spinning.\" Symptoms resolved. PT then went to  and when PT put head back for washing, began feeling dizziness again. C/o of front right headache.  In the ED, /78.   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   CTA head/neck (11/26): no occlusion cervical or intracranial vasculature; mild stenosis left vertebral artery origin  CXR (11/26): bibasilar subsegmental atelectasis (right worse than left); no focal consolidation   On exam, NIHSS at presentation 1 (left facial droop), no TNK given     Admit as observation level of care for dizziness.  Plan: Stroke pathway initiated, Neuro consulted. PT, OT, SLP consults. Neurochecks. Telemetry monitor. Continue aspirin 81 mg daily, sub pravastatin for rosuvastatin. Check A1c, lipid panel. Stat CTH for any acute neuro changes. No DVT prophylaxis due to h/o SDH. MRI, echo. Permissive hypertension, goal SBP >200 for first 24hr    Anticipated Length of Stay/Certification Statement:  Patient will be admitted on an observation basis with an anticipated length of stay of less than 2 midnights secondary to CVA rule out .     Date: 11/27   Day 2: Pt w/ no further episodes of vertigo or other new neurologic symptoms. Cont stroke pathway. MRI pending. Neuro consult pending. Neuro checks. ASA, statin. F/u A1c, lipid panel. Tele. F/u echo. Permissive hypertension, goal up to  for first 24hr  PE: slight L facial droop at baseline.    ED Treatment-Medication Administration from 11/26/2024 1125 to 11/26/2024 2043         Date/Time Order Dose Route Action     11/26/2024 1159 iohexol (OMNIPAQUE) 350 MG/ML injection (MULTI-DOSE) 85 mL 85 mL Intravenous Given     11/26/2024 1823 pravastatin (PRAVACHOL) tablet 40 mg 40 mg Oral Given     11/26/2024 1823 furosemide (LASIX) tablet 20 mg 20 mg Oral Given            Scheduled Medications:  aspirin, 81 " mg, Oral, Daily  furosemide, 20 mg, Oral, BID (diuretic)  ketorolac, 1 drop, Right Eye, Daily  [START ON 11/28/2024] levothyroxine, 112 mcg, Oral, Early Morning  magnesium chloride, 64 mg, Oral, Daily  melatonin, 6 mg, Oral, HS  [Held by provider] metoprolol succinate, 50 mg, Oral, Daily  pantoprazole, 20 mg, Oral, Daily  [Held by provider] potassium chloride, 20 mEq, Oral, BID  pravastatin, 40 mg, Oral, Daily With Dinner  prednisoLONE acetate, 1 drop, Right Eye, BID      Continuous IV Infusions: none     PRN Meds:  acetaminophen, 650 mg, Oral, Q6H PRN  aluminum-magnesium hydroxide-simethicone, 30 mL, Oral, Q6H PRN  Artificial Tears, 2 drop, Both Eyes, 4x Daily PRN  ondansetron, 4 mg, Intravenous, Q6H PRN  polyethylene glycol, 17 g, Oral, Daily PRN      ED Triage Vitals   Temperature Pulse Respirations Blood Pressure SpO2 Pain Score   11/26/24 1130 11/26/24 1130 11/26/24 1130 11/26/24 1130 11/26/24 1155 11/26/24 2100   98 °F (36.7 °C) (!) 50 16 (!) 176/78 97 % No Pain     Weight (last 2 days)       Date/Time Weight    11/27/24 0900 83 (183)    11/27/24 0500 83.1 (183.2)    11/26/24 1155 83 (183)            Vital Signs (last 3 days)       Date/Time Temp Pulse Resp BP MAP (mmHg) SpO2 O2 Device Patient Position - Orthostatic VS Alex Coma Scale Score Pain    11/27/24 1500 98.5 °F (36.9 °C) 56 18 133/68 90 94 % None (Room air) Lying 15 No Pain    11/27/24 1100 98.5 °F (36.9 °C) 56 18 133/66 90 95 % None (Room air) Lying 15 No Pain    11/27/24 0900 -- 58 -- 133/68 -- -- -- -- -- --    11/27/24 0800 -- -- -- -- -- 95 % None (Room air) -- 15 No Pain    11/27/24 07:50:15 -- 58 -- 133/68 90 93 % -- -- -- --    11/27/24 0700 98.5 °F (36.9 °C) 58 18 133/68 90 93 % None (Room air) Sitting 15 No Pain    11/27/24 0300 98.5 °F (36.9 °C) 48 18 124/57 79 91 % -- -- 15 --    11/26/24 23:06:49 98.2 °F (36.8 °C) 60 -- 123/42 69 92 % -- -- -- --    11/26/24 2300 -- -- -- -- -- -- -- -- 15 --    11/26/24 2100 -- 57 -- 157/68 98 93 % --  -- 15 No Pain    11/26/24 20:52:27 98.4 °F (36.9 °C) 57 -- 157/68 98 95 % -- -- -- --    11/26/24 2000 -- 57 16 148/66 95 94 % None (Room air) Lying -- --    11/26/24 1900 -- 76 47 184/81 -- 92 % None (Room air) Lying -- --    11/26/24 1815 -- 52 16 160/73 105 95 % None (Room air) Lying -- --    11/26/24 1800 -- -- -- -- -- -- -- -- 15 --    11/26/24 1730 -- 50 20 159/70 101 97 % -- -- -- --    11/26/24 1700 -- 51 24 151/66 95 97 % -- -- 15 --    11/26/24 1645 -- 50 18 151/65 94 96 % None (Room air) Lying -- --    11/26/24 1600 -- -- 12 136/65 93 -- -- -- 15 --    11/26/24 1530 -- 58 18 182/73 105 97 % None (Room air) Lying -- --    11/26/24 1510 -- -- -- -- -- -- -- -- 15 --    11/26/24 1500 -- 48 22 164/72 -- 97 % -- -- 15 --    11/26/24 1445 -- 54 20 201/81 -- 97 % None (Room air) -- 15 --    11/26/24 1430 -- -- -- -- -- -- -- -- 15 --    11/26/24 1415 -- 48 18 178/75 -- 99 % None (Room air) -- 15 --    11/26/24 1400 -- 47 21 188/79 113 99 % None (Room air) Lying 15 --    11/26/24 1345 -- 48 19 191/79 -- 100 % None (Room air) -- 15 --    11/26/24 1330 -- 48 18 195/80 -- 98 % None (Room air) -- 15 --    11/26/24 1315 -- 48 20 188/82 -- 98 % None (Room air) Lying 15 --    11/26/24 1300 98 °F (36.7 °C) 48 18 192/83 -- 98 % None (Room air) -- 15 --    11/26/24 1245 -- 49 18 215/85 122 99 % None (Room air) Lying 15 --    11/26/24 1230 -- 51 18 190/80 -- 99 % -- -- 15 --    11/26/24 1215 -- 52 20 202/81 116 99 % -- Sitting 15 --    11/26/24 1200 98 °F (36.7 °C) 56 18 199/79 -- 98 % -- Lying 15 --    11/26/24 1155 -- 50 -- -- -- 97 % -- -- -- --    11/26/24 1145 -- -- -- -- -- -- -- -- 15 --    11/26/24 1140 98 °F (36.7 °C) 52 18 176/78 -- -- -- Lying -- --    11/26/24 1130 98 °F (36.7 °C) 50 16 176/78 -- -- -- -- 15 --    11/26/24 11:28:35 -- -- -- -- -- -- -- -- 15 --              Pertinent Labs/Diagnostic Test Results:   Radiology:  XR stroke alert portable chest   Final Interpretation by Basim Montemayor MD  (11/26 1213)      Bibasilar subsegmental atelectasis (right worse than left). No focal consolidation.      The study was marked in EPIC for immediate notification.         Workstation performed: [id number]         CTA stroke alert (head/neck)   Final Interpretation by Akin Pena DO (11/26 1220)      No occlusion of the cervical or intracranial vasculature.      Mild stenosis of the left vertebral artery origin.      Findings were directly discussed with Zane Huynh at 12:18 p.m.      Workstation performed: [id number]         CT stroke alert brain   Final Interpretation by Akin Pena DO (11/26 1221)      No acute intracranial abnormality.      Old infarcts within the right hemisphere with moderate chronic microangiopathic change noted in the periventricular white matter.      Findings were directly discussed with Zane Huynh at approximately 12:18 p.m.      Workstation performed: [id number]         MRI brain wo contrast    (Results Pending)     Cardiology:  Echo complete w/ contrast if indicated   Final Result by Kylah Block MD (11/27 1119)        Left Ventricle: Left ventricular cavity size is normal. Wall thickness    is normal. The left ventricular ejection fraction is 60-65% by visual    estimation. Systolic function is normal. Although no diagnostic regional    wall motion abnormality was identified, this possibility cannot be    completely excluded on the basis of this study. Diastolic function is    normal for age.     IVS: There is abnormal septal motion of unclear etiology.     Right Ventricle: Right ventricular cavity size is normal. Systolic    function is normal.     Right Atrium: The atrium is mildly dilated.     Atrial Septum: No patent foramen ovale detected, confirmed at rest    using color doppler.     Mitral Valve: There is mild annular calcification.     Tricuspid Valve: There is mild regurgitation.     Pulmonic Valve: There is mild to moderate regurgitation.  "        ECG 12 lead   Final Result by Kimberlyn Barron MD (11/26 4142)   Sinus bradycardia with 1st degree A-V block   T wave abnormality, consider lateral ischemia   Abnormal ECG   No previous ECGs available   Confirmed by Kimberlyn Barron ([id number]) on 11/26/2024 1:41:34 PM        GI:  No orders to display           Results from last 7 days   Lab Units 11/26/24  1153   WBC Thousand/uL 8.00   HEMOGLOBIN g/dL 12.9   HEMATOCRIT % 39.5   PLATELETS Thousands/uL 174         Results from last 7 days   Lab Units 11/26/24  1153   SODIUM mmol/L 138   POTASSIUM mmol/L 5.1   CHLORIDE mmol/L 105   CO2 mmol/L 27   ANION GAP mmol/L 6   BUN mg/dL 14   CREATININE mg/dL 0.81   EGFR ml/min/1.73sq m 67   CALCIUM mg/dL 9.7         Results from last 7 days   Lab Units 11/26/24  1129   POC GLUCOSE mg/dl 97     Results from last 7 days   Lab Units 11/26/24  1153   GLUCOSE RANDOM mg/dL 88             No results found for: \"BETA-HYDROXYBUTYRATE\"                   Results from last 7 days   Lab Units 11/26/24  1330 11/26/24  1153   HS TNI 0HR ng/L  --  4   HS TNI 2HR ng/L 4  --    HSTNI D2 ng/L 0  --          Results from last 7 days   Lab Units 11/26/24  1330   PROTIME seconds 12.8   INR  0.89   PTT seconds 23     Results from last 7 days   Lab Units 11/26/24  1153   TSH 3RD GENERATON uIU/mL 0.041*                                                                                                           Past Medical History:   Diagnosis Date    Stroke (HCC)     Vomiting 03/21/2024     Present on Admission:   Dizziness   Chronic heart failure with preserved ejection fraction (HCC)   History of seizures   Hypothyroid   COPD (chronic obstructive pulmonary disease) (HCC)   Hypertensive urgency      Admitting Diagnosis: Dizziness [R42]  Dizzy [R42]  History of seizures [Z87.898]  History of subdural hematoma [Z86.79]  Age/Sex: 84 y.o. female    Network Utilization Review Department  ATTENTION: Please call with any questions or concerns to " [phone] and carefully listen to the prompts so that you are directed to the right person. All voicemails are confidential.   For Discharge needs, contact Care Management DC Support Team at [phone] opt. 2  Send all requests for admission clinical reviews, approved or denied determinations and any other requests to dedicated fax number below belonging to the campus where the patient is receiving treatment. List of dedicated fax numbers for the Facilities:  FACILITY NAME UR FAX NUMBER   ADMISSION DENIALS (Administrative/Medical Necessity) [phone]   DISCHARGE SUPPORT TEAM (NETWORK) [phone]   PARENT CHILD HEALTH (Maternity/NICU/Pediatrics) [phone]   [facility] [phone]   [facility] [phone]   [facility] [phone]   [facility] [phone]   [facility] [phone]   [facility] [phone]   [facility] [phone]   [facility] [phone]   [facility] [phone]   [facility] [phone]   [facility] [phone]   [facility] [phone]

## 2024-11-27 NOTE — PLAN OF CARE
Problem: PAIN - ADULT  Goal: Verbalizes/displays adequate comfort level or baseline comfort level  Description: Interventions:  - Encourage patient to monitor pain and request assistance  - Assess pain using appropriate pain scale  - Administer analgesics based on type and severity of pain and evaluate response  - Implement non-pharmacological measures as appropriate and evaluate response  - Consider cultural and social influences on pain and pain management  - Notify physician/advanced practitioner if interventions unsuccessful or patient reports new pain  Outcome: Progressing     Problem: INFECTION - ADULT  Goal: Absence or prevention of progression during hospitalization  Description: INTERVENTIONS:  - Assess and monitor for signs and symptoms of infection  - Monitor lab/diagnostic results  - Monitor all insertion sites, i.e. indwelling lines, tubes, and drains  - Monitor endotracheal if appropriate and nasal secretions for changes in amount and color  - [place] appropriate cooling/warming therapies per order  - Administer medications as ordered  - Instruct and encourage patient and family to use good hand hygiene technique  - Identify and instruct in appropriate isolation precautions for identified infection/condition  Outcome: Progressing  Goal: Absence of fever/infection during neutropenic period  Description: INTERVENTIONS:  - Monitor WBC    Outcome: Progressing     Problem: DISCHARGE PLANNING  Goal: Discharge to home or other facility with appropriate resources  Description: INTERVENTIONS:  - Identify barriers to discharge w/patient and caregiver  - Arrange for needed discharge resources and transportation as appropriate  - Identify discharge learning needs (meds, wound care, etc.)  - Arrange for interpretive services to assist at discharge as needed  - Refer to Case Management Department for coordinating discharge planning if the patient needs post-hospital services based on physician/advanced  practitioner order or complex needs related to functional status, cognitive ability, or social support system  Outcome: Progressing     Problem: Knowledge Deficit  Goal: Patient/family/caregiver demonstrates understanding of disease process, treatment plan, medications, and discharge instructions  Description: Complete learning assessment and assess knowledge base.  Interventions:  - Provide teaching at level of understanding  - Provide teaching via preferred learning methods  Outcome: Progressing

## 2024-11-27 NOTE — PLAN OF CARE
Problem: PAIN - ADULT  Goal: Verbalizes/displays adequate comfort level or baseline comfort level  Description: Interventions:  - Encourage patient to monitor pain and request assistance  - Assess pain using appropriate pain scale  - Administer analgesics based on type and severity of pain and evaluate response  - Implement non-pharmacological measures as appropriate and evaluate response  - Consider cultural and social influences on pain and pain management  - Notify physician/advanced practitioner if interventions unsuccessful or patient reports new pain  Outcome: Progressing     Problem: INFECTION - ADULT  Goal: Absence or prevention of progression during hospitalization  Description: INTERVENTIONS:  - Assess and monitor for signs and symptoms of infection  - Monitor lab/diagnostic results  - Monitor all insertion sites, i.e. indwelling lines, tubes, and drains  - Monitor endotracheal if appropriate and nasal secretions for changes in amount and color  - [place] appropriate cooling/warming therapies per order  - Administer medications as ordered  - Instruct and encourage patient and family to use good hand hygiene technique  - Identify and instruct in appropriate isolation precautions for identified infection/condition  Outcome: Progressing  Goal: Absence of fever/infection during neutropenic period  Description: INTERVENTIONS:  - Monitor WBC    Outcome: Progressing     Problem: SAFETY ADULT  Goal: Patient will remain free of falls  Description: INTERVENTIONS:  - Educate patient/family on patient safety including physical limitations  - Instruct patient to call for assistance with activity   - Consult OT/PT to assist with strengthening/mobility   - Keep Call bell within reach  - Keep bed low and locked with side rails adjusted as appropriate  - Keep care items and personal belongings within reach  - Initiate and maintain comfort rounds  - Make Fall Risk Sign visible to staff  - Offer Toileting every  Hours,  in advance of need  - Initiate/Maintain alarm  - Obtain necessary fall risk management equipment:  - Apply yellow socks and bracelet for high fall risk patients  - Consider moving patient to room near nurses station  Outcome: Progressing  Goal: Maintain or return to baseline ADL function  Description: INTERVENTIONS:  -  Assess patient's ability to carry out ADLs; assess patient's baseline for ADL function and identify physical deficits which impact ability to perform ADLs (bathing, care of mouth/teeth, toileting, grooming, dressing, etc.)  - Assess/evaluate cause of self-care deficits   - Assess range of motion  - Assess patient's mobility; develop plan if impaired  - Assess patient's need for assistive devices and provide as appropriate  - Encourage maximum independence but intervene and supervise when necessary  - Involve family in performance of ADLs  - Assess for home care needs following discharge   - Consider OT consult to assist with ADL evaluation and planning for discharge  - Provide patient education as appropriate  Outcome: Progressing  Goal: Maintains/Returns to pre admission functional level  Description: INTERVENTIONS:  - Perform AM-PAC 6 Click Basic Mobility/ Daily Activity assessment daily.  - Set and communicate daily mobility goal to care team and patient/family/caregiver.   - Collaborate with rehabilitation services on mobility goals if consulted  - Perform Range of Motion  times a day.  - Reposition patient every  hours.  - Dangle patient  times a day  - Stand patient  times a day  - Ambulate patient times a day  - Out of bed to chair  times a day   - Out of bed for manpreet times a day  - Out of bed for toileting  - Record patient progress and toleration of activity level   Outcome: Progressing     Problem: DISCHARGE PLANNING  Goal: Discharge to home or other facility with appropriate resources  Description: INTERVENTIONS:  - Identify barriers to discharge w/patient and caregiver  - Arrange for  needed discharge resources and transportation as appropriate  - Identify discharge learning needs (meds, wound care, etc.)  - Arrange for interpretive services to assist at discharge as needed  - Refer to Case Management Department for coordinating discharge planning if the patient needs post-hospital services based on physician/advanced practitioner order or complex needs related to functional status, cognitive ability, or social support system  Outcome: Progressing     Problem: Knowledge Deficit  Goal: Patient/family/caregiver demonstrates understanding of disease process, treatment plan, medications, and discharge instructions  Description: Complete learning assessment and assess knowledge base.  Interventions:  - Provide teaching at level of understanding  - Provide teaching via preferred learning methods  Outcome: Progressing

## 2024-11-27 NOTE — SPEECH THERAPY NOTE
Speech-Language Pathology Bedside Swallow Evaluation        Patient Name: Pebbles Carreon    Today's Date: 11/27/2024     Problem List  Principal Problem:    Dizziness  Active Problems:    Chronic heart failure with preserved ejection fraction (HCC)    History of seizures    Hypothyroid    COPD (chronic obstructive pulmonary disease) ([facility])    Hypertensive urgency         Past Medical History  Past Medical History:   Diagnosis Date    Stroke (HCC)     Vomiting 03/21/2024       Past Surgical History  Past Surgical History:   Procedure Laterality Date    CORONARY ARTERY BYPASS GRAFT  2012    X 4    CRANIOTOMY  04/10/2022    Right-sided frontal and parietal bur holes with evacuation of subdural hematoma    TOTAL SHOULDER ARTHROPLASTY Right 03/24/2022       Summary/Impressions:   Bedside observations support grossly intact oropharyngeal swallow function across all consistencies tested.  Self-fed solid and liquid trials with no s/s of dysphagia or distress.  Patient denies difficulties or changes from baseline function.  Note facial droop baseline for patient.      Recommendations:   Diet: regular diet and thin liquids   Meds: whole with liquid   Frequent Oral care: 2-4x/day  Aspiration precautions and compensatory swallowing strategies: upright posture  Other Recommendations/ considerations: No further ST follow-up        Current Medical Status  Pt is a 84 y.o. female who presented to [facility] with  dizziness that started 11/26 while at the hair salon, patient tilting head back to place eye drops and experienced sensation of the room spinning.  At present time symptoms resolved. No associated headache or visual changes. Denies recent URI.  Placed on stroke pathway.    Past medical history:  Please see H&P for details    Special Studies:  MRI pending.    Chest XR 11/26/24:  Bibasilar subsegmental atelectasis (right worse than left). No focal consolidation.     Social/Education/Vocational Hx:  Pt lives alone,  private caregivers     Swallow Information   Current Risks for Dysphagia & Aspiration: CVA and stroke pathway  Current Symptoms/Concerns:  facial weakness/droop ?baseline   Current Diet: regular diet and thin liquids   Baseline Diet: regular diet and thin liquids    Baseline Assessment   Behavior/Cognition: alert  Speech/Language Status: able to participate in conversation  Patient Positioning: upright in chair    Swallow Mechanism Exam   Facial: left facial droop  Labial: decreased ROM left side (noted craniotomy in chart)   Lingual: WFL  Velum: symmetrical  Mandible: adequate ROM  Dentition: adequate  Vocal quality:clear/adequate   Volitional Cough: strong/productive   Respiratory: RA    Consistencies Assessed and Performance   Consistencies Administered: thin liquids, puree, and hard solids    Oral Stage: WFL. Patient presents with adequate bolus acceptance, containment and manipulation.  Mastication judged to be prolonged yet complete.  No oral residue.     Pharyngeal Stage: Appears functional.  Laryngeal rise noted upon palpation.  Swallow initiation appears timely.  No overt s/s of aspiration or distress.  Vocal quality remains clear and dry.     Esophageal Concerns: none reported    Plan  No additional follow-up at this time. Please re-order should pt exhibit change in status or concerns arise.     Results Reviewed with: patient and RN         Lelo Drummond MS, CCC-SLP  Speech-Language Pathologist  [place] #[id number]  [place] #[id number]

## 2024-11-27 NOTE — ASSESSMENT & PLAN NOTE
Presents with dizziness that started 11/26 while at the hair salon, patient tilting head back to place eye drops and experienced sensation of the room spinning.  At present time symptoms resolved. No associated headache or visual changes. Denies recent URI.    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   CTA head/neck (11/26): no occlusion cervical or intracranial vasculature; mild stenosis left vertebral artery origin  CXR (11/26): bibasilar subsegmental atelectasis (right worse than left); no focal consolidation     NIHSS at presentation 1 (left facial droop), no TNK given  Stroke pathway initiated,  Neuro consulted  PT, OT, SLP consults  Fall precautions  Neurochecks per protocol  Telemetry monitor   Continue aspirin 81 mg daily, sub pravastatin for rosuvastatin   Check A1c, lipid panel  Stat CTH for any acute neuro changes  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

## 2024-11-27 NOTE — ASSESSMENT & PLAN NOTE
Chronic hypertension, on metoprolol succinate and lasix; presenting BP range 199-202/79-85  Monitor VS per stroke protocol   Blood Pressure: 133/68

## 2024-11-27 NOTE — ED NOTES
Eyedrops taken up to pharmacy at this time. Slip given to next care nurse Rea Shah.     Pricila Rangel RN  11/26/24 1409

## 2024-11-28 NOTE — PLAN OF CARE
Problem: PAIN - ADULT  Goal: Verbalizes/displays adequate comfort level or baseline comfort level  Description: Interventions:  - Encourage patient to monitor pain and request assistance  - Assess pain using appropriate pain scale  - Administer analgesics based on type and severity of pain and evaluate response  - Implement non-pharmacological measures as appropriate and evaluate response  - Consider cultural and social influences on pain and pain management  - Notify physician/advanced practitioner if interventions unsuccessful or patient reports new pain  Outcome: Progressing     Problem: INFECTION - ADULT  Goal: Absence or prevention of progression during hospitalization  Description: INTERVENTIONS:  - Assess and monitor for signs and symptoms of infection  - Monitor lab/diagnostic results  - Monitor all insertion sites, i.e. indwelling lines, tubes, and drains  - Monitor endotracheal if appropriate and nasal secretions for changes in amount and color  - [place] appropriate cooling/warming therapies per order  - Administer medications as ordered  - Instruct and encourage patient and family to use good hand hygiene technique  - Identify and instruct in appropriate isolation precautions for identified infection/condition  Outcome: Progressing  Goal: Absence of fever/infection during neutropenic period  Description: INTERVENTIONS:  - Monitor WBC    Outcome: Progressing     Problem: SAFETY ADULT  Goal: Patient will remain free of falls  Description: INTERVENTIONS:  - Educate patient/family on patient safety including physical limitations  - Instruct patient to call for assistance with activity   - Consult OT/PT to assist with strengthening/mobility   - Keep Call bell within reach  - Keep bed low and locked with side rails adjusted as appropriate  - Keep care items and personal belongings within reach  - Initiate and maintain comfort rounds  - Make Fall Risk Sign visible to staff  - Offer Toileting every  Hours,  in advance of need  - Initiate/Maintain alarm  - Obtain necessary fall risk management equipment:   - Apply yellow socks and bracelet for high fall risk patients  - Consider moving patient to room near nurses station  Outcome: Progressing  Goal: Maintain or return to baseline ADL function  Description: INTERVENTIONS:  -  Assess patient's ability to carry out ADLs; assess patient's baseline for ADL function and identify physical deficits which impact ability to perform ADLs (bathing, care of mouth/teeth, toileting, grooming, dressing, etc.)  - Assess/evaluate cause of self-care deficits   - Assess range of motion  - Assess patient's mobility; develop plan if impaired  - Assess patient's need for assistive devices and provide as appropriate  - Encourage maximum independence but intervene and supervise when necessary  - Involve family in performance of ADLs  - Assess for home care needs following discharge   - Consider OT consult to assist with ADL evaluation and planning for discharge  - Provide patient education as appropriate  Outcome: Progressing  Goal: Maintains/Returns to pre admission functional level  Description: INTERVENTIONS:  - Perform AM-PAC 6 Click Basic Mobility/ Daily Activity assessment daily.  - Set and communicate daily mobility goal to care team and patient/family/caregiver.   - Collaborate with rehabilitation services on mobility goals if consulted  - Perform Range of Motion times a day.  - Reposition patient every  hours.  - Dangle patient  times a day  - Stand patient  times a day  - Ambulate patient  times a day  - Out of bed to chair  times a day   - Out of bed for meals  times a day  - Out of bed for toileting  - Record patient progress and toleration of activity level   Outcome: Progressing     Problem: DISCHARGE PLANNING  Goal: Discharge to home or other facility with appropriate resources  Description: INTERVENTIONS:  - Identify barriers to discharge w/patient and caregiver  - Arrange for  needed discharge resources and transportation as appropriate  - Identify discharge learning needs (meds, wound care, etc.)  - Arrange for interpretive services to assist at discharge as needed  - Refer to Case Management Department for coordinating discharge planning if the patient needs post-hospital services based on physician/advanced practitioner order or complex needs related to functional status, cognitive ability, or social support system  Outcome: Progressing     Problem: Knowledge Deficit  Goal: Patient/family/caregiver demonstrates understanding of disease process, treatment plan, medications, and discharge instructions  Description: Complete learning assessment and assess knowledge base.  Interventions:  - Provide teaching at level of understanding  - Provide teaching via preferred learning methods  Outcome: Progressing     Problem: Nutrition/Hydration-ADULT  Goal: Nutrient/Hydration intake appropriate for improving, restoring or maintaining nutritional needs  Description: Monitor and assess patient's nutrition/hydration status for malnutrition. Collaborate with interdisciplinary team and initiate plan and interventions as ordered.  Monitor patient's weight and dietary intake as ordered or per policy. Utilize nutrition screening tool and intervene as necessary. Determine patient's food preferences and provide high-protein, high-caloric foods as appropriate.     INTERVENTIONS:  - Monitor oral intake, urinary output, labs, and treatment plans  - Assess nutrition and hydration status and recommend course of action  - Evaluate amount of meals eaten  - Assist patient with eating if necessary   - Allow adequate time for meals  - Recommend/ encourage appropriate diets, oral nutritional supplements, and vitamin/mineral supplements  - Order, calculate, and assess calorie counts as needed  - Recommend, monitor, and adjust tube feedings and TPN/PPN based on assessed needs  - Assess need for intravenous fluids  -  Provide specific nutrition/hydration education as appropriate  - Include patient/family/caregiver in decisions related to nutrition  Outcome: Progressing

## 2024-11-29 NOTE — UTILIZATION REVIEW
NOTIFICATION OF INPATIENT ADMISSION   AUTHORIZATION REQUEST   SERVICING FACILITY:   [place] [address]  Tax ID: [id number]  NPI: [id number] ATTENDING PROVIDER:  Attending Name and NPI#: Terrence Sesay Md [[id number]]  Address: [address] [place] [address]  Phone: [phone]     ADMISSION INFORMATION:  Place of Service: Inpatient Acute [facility]  Place of Service Code: 21  Inpatient Admission Date/Time: 11/27/24  3:00 PM  Discharge Date/Time: 11/27/2024  9:29 PM  Admitting Diagnosis Code/Description:  Dizziness [R42]  Dizzy [R42]  History of seizures [Z87.898]  History of subdural hematoma [Z86.79]     UTILIZATION REVIEW CONTACT:  Monica Mendoza, Utilization   Network Utilization Review Department  Phone: [phone]  Fax [phone]  Email: [email or web address]  Contact for approvals/pending authorizations, clinical reviews, and discharge.     PHYSICIAN ADVISORY SERVICES:  Medical Necessity Denial & Hjcf-ml-Fgxt Review  Phone: [phone]  Fax: [phone]  Email: [email or web address]     DISCHARGE SUPPORT TEAM:  For Patients Discharge Needs & Updates  Phone: [phone] opt. 2 Fax: [phone]  Email: [email or web address]